# Patient Record
Sex: FEMALE | Race: BLACK OR AFRICAN AMERICAN | Employment: UNEMPLOYED | URBAN - METROPOLITAN AREA
[De-identification: names, ages, dates, MRNs, and addresses within clinical notes are randomized per-mention and may not be internally consistent; named-entity substitution may affect disease eponyms.]

---

## 2017-01-19 RX ORDER — GABAPENTIN 600 MG/1
600 TABLET ORAL 2 TIMES DAILY
Qty: 60 TAB | Refills: 6 | Status: SHIPPED | OUTPATIENT
Start: 2017-01-19 | End: 2017-07-07 | Stop reason: SDUPTHER

## 2017-03-07 RX ORDER — SERTRALINE HYDROCHLORIDE 50 MG/1
50 TABLET, FILM COATED ORAL DAILY
Qty: 30 TAB | Refills: 6 | Status: SHIPPED | OUTPATIENT
Start: 2017-03-07 | End: 2017-11-04 | Stop reason: SDUPTHER

## 2017-06-08 RX ORDER — LOSARTAN POTASSIUM AND HYDROCHLOROTHIAZIDE 25; 100 MG/1; MG/1
1 TABLET ORAL DAILY
Qty: 90 TAB | Refills: 1 | Status: SHIPPED | OUTPATIENT
Start: 2017-06-08 | End: 2017-11-30 | Stop reason: SDUPTHER

## 2017-07-07 ENCOUNTER — OFFICE VISIT (OUTPATIENT)
Dept: INTERNAL MEDICINE CLINIC | Facility: CLINIC | Age: 57
End: 2017-07-07

## 2017-07-07 VITALS
WEIGHT: 173.9 LBS | DIASTOLIC BLOOD PRESSURE: 68 MMHG | SYSTOLIC BLOOD PRESSURE: 98 MMHG | RESPIRATION RATE: 18 BRPM | BODY MASS INDEX: 27.95 KG/M2 | OXYGEN SATURATION: 94 % | TEMPERATURE: 97.9 F | HEIGHT: 66 IN | HEART RATE: 65 BPM

## 2017-07-07 DIAGNOSIS — E66.09 OBESITY DUE TO EXCESS CALORIES, UNSPECIFIED OBESITY SEVERITY: ICD-10-CM

## 2017-07-07 DIAGNOSIS — Z72.0 TOBACCO ABUSE: ICD-10-CM

## 2017-07-07 DIAGNOSIS — E78.5 HYPERLIPIDEMIA, UNSPECIFIED HYPERLIPIDEMIA TYPE: ICD-10-CM

## 2017-07-07 DIAGNOSIS — G89.29 CHRONIC BACK PAIN, UNSPECIFIED BACK LOCATION, UNSPECIFIED BACK PAIN LATERALITY: Primary | ICD-10-CM

## 2017-07-07 DIAGNOSIS — J06.9 VIRAL UPPER RESPIRATORY TRACT INFECTION: ICD-10-CM

## 2017-07-07 DIAGNOSIS — M54.9 CHRONIC BACK PAIN, UNSPECIFIED BACK LOCATION, UNSPECIFIED BACK PAIN LATERALITY: Primary | ICD-10-CM

## 2017-07-07 DIAGNOSIS — I10 ESSENTIAL HYPERTENSION: ICD-10-CM

## 2017-07-07 DIAGNOSIS — F32.A DEPRESSION, UNSPECIFIED DEPRESSION TYPE: ICD-10-CM

## 2017-07-07 DIAGNOSIS — L30.9 ECZEMA, UNSPECIFIED TYPE: ICD-10-CM

## 2017-07-07 RX ORDER — AZITHROMYCIN 250 MG/1
TABLET, FILM COATED ORAL
Qty: 6 TAB | Refills: 0 | Status: SHIPPED | OUTPATIENT
Start: 2017-07-07 | End: 2017-11-16 | Stop reason: ALTCHOICE

## 2017-07-07 RX ORDER — ALBUTEROL SULFATE 90 UG/1
1 AEROSOL, METERED RESPIRATORY (INHALATION)
Qty: 1 INHALER | Refills: 1 | Status: SHIPPED | OUTPATIENT
Start: 2017-07-07 | End: 2020-01-29 | Stop reason: SDUPTHER

## 2017-07-07 RX ORDER — GABAPENTIN 600 MG/1
600 TABLET ORAL 3 TIMES DAILY
Qty: 90 TAB | Refills: 6 | Status: SHIPPED | OUTPATIENT
Start: 2017-07-07 | End: 2018-03-06 | Stop reason: SDUPTHER

## 2017-07-07 RX ORDER — TRIAMCINOLONE ACETONIDE 0.25 MG/G
CREAM TOPICAL DAILY
Qty: 30 G | Refills: 1 | Status: SHIPPED | OUTPATIENT
Start: 2017-07-07 | End: 2017-09-01 | Stop reason: SDUPTHER

## 2017-07-07 NOTE — PROGRESS NOTES
Room 1    Chief Complaint   Patient presents with    Hypertension     Follow up    Chest Congestion     Patient states she has a little chest congestion    Medication Evaluation     Patient would like to discuss increasing dosages on some of her medications     1. Have you been to the ER, urgent care clinic since your last visit? Hospitalized since your last visit? No    2. Have you seen or consulted any other health care providers outside of the [facility] since your last visit? Include any pap smears or colon screening.  No    Health Maintenance Due   Topic Date Due    Hepatitis C Screening  1960    Pneumococcal 19-64 Medium Risk (1 of 1 - PPSV23) 08/22/1977    DTaP/Tdap/Td series (1 - Tdap) 08/22/1979    PAP AKA CERVICAL CYTOLOGY  08/22/1979    BREAST CANCER SCRN MAMMOGRAM  08/22/2008    FOBT Q 1 YEAR AGE 50-75  08/22/2008

## 2017-07-10 NOTE — PROGRESS NOTES
Discussed depression at length with patient. Also discussed alternatives including increasing zoloft to 100 mg or adding tramadol. We also discussed changing the medication altogether. Patient has opted to continue on the zoloft for now. May need to consider Psych consult.

## 2017-09-05 RX ORDER — AMLODIPINE BESYLATE 10 MG/1
TABLET ORAL
Qty: 90 TAB | Refills: 1 | Status: SHIPPED | OUTPATIENT
Start: 2017-09-05 | End: 2017-11-16 | Stop reason: SDUPTHER

## 2017-09-05 RX ORDER — TRIAMCINOLONE ACETONIDE 0.25 MG/G
CREAM TOPICAL
Qty: 30 G | Refills: 1 | Status: SHIPPED | OUTPATIENT
Start: 2017-09-05 | End: 2018-02-05 | Stop reason: SDUPTHER

## 2017-09-05 RX ORDER — AMLODIPINE BESYLATE 10 MG/1
TABLET ORAL
Qty: 90 TAB | Refills: 1 | Status: SHIPPED | OUTPATIENT
Start: 2017-09-05 | End: 2018-02-05 | Stop reason: SDUPTHER

## 2017-11-06 RX ORDER — SERTRALINE HYDROCHLORIDE 50 MG/1
TABLET, FILM COATED ORAL
Qty: 30 TAB | Refills: 6 | Status: SHIPPED | OUTPATIENT
Start: 2017-11-06 | End: 2018-06-05 | Stop reason: SDUPTHER

## 2017-11-16 ENCOUNTER — OFFICE VISIT (OUTPATIENT)
Dept: INTERNAL MEDICINE CLINIC | Facility: CLINIC | Age: 57
End: 2017-11-16

## 2017-11-16 VITALS
HEART RATE: 64 BPM | DIASTOLIC BLOOD PRESSURE: 61 MMHG | RESPIRATION RATE: 18 BRPM | SYSTOLIC BLOOD PRESSURE: 98 MMHG | TEMPERATURE: 97.8 F | BODY MASS INDEX: 27.97 KG/M2 | HEIGHT: 66 IN | WEIGHT: 174 LBS

## 2017-11-16 DIAGNOSIS — G89.29 CHRONIC BACK PAIN, UNSPECIFIED BACK LOCATION, UNSPECIFIED BACK PAIN LATERALITY: ICD-10-CM

## 2017-11-16 DIAGNOSIS — M54.9 CHRONIC BACK PAIN, UNSPECIFIED BACK LOCATION, UNSPECIFIED BACK PAIN LATERALITY: ICD-10-CM

## 2017-11-16 DIAGNOSIS — M48.062 SPINAL STENOSIS OF LUMBAR REGION WITH NEUROGENIC CLAUDICATION: Primary | ICD-10-CM

## 2017-11-16 DIAGNOSIS — M25.50 ARTHRALGIA, UNSPECIFIED JOINT: ICD-10-CM

## 2017-11-16 RX ORDER — METHYLPREDNISOLONE 4 MG/1
TABLET ORAL
Qty: 1 DOSE PACK | Refills: 0 | Status: SHIPPED | OUTPATIENT
Start: 2017-11-16 | End: 2017-11-22

## 2017-11-16 NOTE — PROGRESS NOTES
Subjective:      Beata Pendleton is a 59 y.o. female who presents today for No chief complaint on file. Patient in today because of concerns regarding pain. She says her arthritis pain is getting worse. She sees Dr. Brendan Lewis. She takes oxycodone 5 times daily. She feels like she is becoming tolerant. She has discussed this with pain management  Over the last year the pain has becoming worse although this regimen worked at first. She takes flexeril and gabapentin daily as well  She says he pain is 7/10 on a regular basis    She asks today about other alternatives like Embrel or time release pain medication. Patient Active Problem List    Diagnosis Date Noted    Spinal stenosis of lumbar region with neurogenic claudication 06/18/2015    Disc herniation 06/18/2015    Chronic back pain 09/17/2012    Joint pain 09/17/2012    Joint stiffness 09/17/2012    HTN (hypertension) 09/17/2012    Arthritis 09/17/2012     Current Outpatient Prescriptions   Medication Sig Dispense Refill    sertraline (ZOLOFT) 50 mg tablet take 1 tablet by mouth once daily 30 Tab 6    amLODIPine (NORVASC) 10 mg tablet take 1 tablet by mouth once daily 90 Tab 1    triamcinolone acetonide (KENALOG) 0.025 % topical cream apply to affected area once daily 30 g 1    gabapentin (NEURONTIN) 600 mg tablet Take 1 Tab by mouth three (3) times daily. 90 Tab 6    albuterol (PROVENTIL HFA, VENTOLIN HFA, PROAIR HFA) 90 mcg/actuation inhaler Take 1 Puff by inhalation every four (4) hours as needed for Wheezing. 1 Inhaler 1    losartan-hydroCHLOROthiazide (HYZAAR) 100-25 mg per tablet Take 1 Tab by mouth daily. 90 Tab 1    cyclobenzaprine (FLEXERIL) 10 mg tablet Take 1 Tab by mouth three (3) times daily as needed for Muscle Spasm(s). 30 Tab 1    oxyCODONE IR (OXY-IR) 15 mg immediate release tablet       guaiFENesin-codeine (GUAIFENESIN AC) 100-10 mg/5 mL solution Take 5 mL by mouth three (3) times daily as needed for Cough.  Max Daily Amount: 15 mL. 236 mL 0     Allergies   Allergen Reactions    Other Medication Other (comments)     Pt stated it caused her stomach to hurt when she took the PO dilaudid but has been given it IV with no reaction. Past Medical History:   Diagnosis Date    Chronic pain     low back pain    Constipation     Depression     Fibromyalgia     Hypertension     Osteoarthritis     Other ill-defined conditions     chronic kidney infections    Second hand smoke exposure     Sickle cell trait ([address]     pt reports    Spinal stenosis     L5-S1 left moderate to severe     Past Surgical History:   Procedure Laterality Date    HX  SECTION      HX ORTHOPAEDIC      R wrist surgery and foot surgery    HX TUBAL LIGATION       Family History   Problem Relation Age of Onset    Hypertension Mother     Sickle Cell Trait Mother     Diabetes Mother     Stroke Mother      SEVERAL TIAS    Hypertension Father     Heart Attack Father     Heart Disease Father     Hypertension Other      sibling    Anesth Problems Neg Hx      Social History   Substance Use Topics    Smoking status: Current Every Day Smoker     Packs/day: 0.30     Years: 30.00     Types: Cigarettes    Smokeless tobacco: Never Used    Alcohol use No        Review of Systems    A comprehensive review of systems was negative except for that written in the HPI. Objective:     Visit Vitals    BP 98/61    Pulse 64    Temp 97.8 °F (36.6 °C) (Oral)    Resp 18    Ht 5' 6\" (1.676 m)    Wt 174 lb (78.9 kg)    BMI 28.08 kg/m2     General:  Alert, cooperative, no distress, appears stated age. Head:  Normocephalic, without obvious abnormality, atraumatic. Eyes:  Conjunctivae/corneas clear. PERRL, EOMs intact. Fundi benign. Ears:  Normal TMs and external ear canals both ears. Nose: Nares normal. Septum midline. Mucosa normal. No drainage or sinus tenderness.    Throat: Lips, mucosa, and tongue normal. Teeth and gums normal.   Neck: Supple, symmetrical, trachea midline, no adenopathy, thyroid: no enlargement/tenderness/nodules, no carotid bruit and no JVD. Back:   Symmetric, no curvature. ROM normal. No CVA tenderness. Lungs:   Clear to auscultation bilaterally. Chest wall:  No tenderness or deformity. Heart:  Regular rate and rhythm, S1, S2 normal, no murmur, click, rub or gallop. Assessment/Plan:       ICD-10-CM ICD-9-CM    1. Spinal stenosis of lumbar region with neurogenic claudication M48.062 724.03 Medrol pack    Follow up with pain management        Had extensive discussion with patient and let her know that she would not be a candidate for Embrel due to the nature of her arthritis. I also stated that I would not be able to change her pain management. She understands that she has a contract with Dr. Merlyn Dewitt. Will try medrol pack although patient is aware this may provide only temporary relief. I can also refer her to rheumatology if she is interested in that   2. Arthralgia, unspecified joint M25.50 719.40 Will refer to rheumatology if patient interested in further eval   3. Chronic back pain, unspecified back location, unspecified back pain laterality M54.9 724.5     G89.29 338.29        Follow-up Disposition: Not on File   Advised her to call back or return to office if symptoms worsen/change/persist.  Discussed expected course/resolution/complications of diagnosis in detail with patient. Medication risks/benefits/costs/interactions/alternatives discussed with patient. She was given an after visit summary which includes diagnoses, current medications, & vitals. She expressed understanding with the diagnosis and plan.

## 2017-11-16 NOTE — MR AVS SNAPSHOT
Visit Information Date & Time Provider Department Dept. Phone Encounter #  
 11/16/2017 11:00 AM Danny Lawson, 85 [facility] Internal Medicine [phone] [id number] Follow-up Instructions Return if symptoms worsen or fail to improve, for follow up. Upcoming Health Maintenance Date Due Hepatitis C Screening 1960 Pneumococcal 19-64 Medium Risk (1 of 1 - PPSV23) 8/22/1977 DTaP/Tdap/Td series (1 - Tdap) 8/22/1979 PAP AKA CERVICAL CYTOLOGY 8/22/1979 BREAST CANCER SCRN MAMMOGRAM 8/22/2008 FOBT Q 1 YEAR AGE 50-75 8/22/2008 Influenza Age 5 to Adult 8/1/2017 Allergies as of 11/16/2017  Review Complete On: 7/7/2017 By: Josephine Butler LPN Severity Noted Reaction Type Reactions Other Medication  06/18/2015    Other (comments) Pt stated it caused her stomach to hurt when she took the PO dilaudid but has been given it IV with no reaction. Current Immunizations  Reviewed on 12/18/2015 Name Date Influenza Vaccine (Trivalent) 12/18/2015 Not reviewed this visit You Were Diagnosed With   
  
 Codes Comments Spinal stenosis of lumbar region with neurogenic claudication    -  Primary ICD-10-CM: Z54.771 
ICD-9-CM: 724.03 Arthralgia, unspecified joint     ICD-10-CM: M25.50 ICD-9-CM: 719.40 Chronic back pain, unspecified back location, unspecified back pain laterality     ICD-10-CM: M54.9, G89.29 ICD-9-CM: 724.5, 338.29 Vitals BP Pulse Temp Resp Height(growth percentile) Weight(growth percentile) 98/61 64 97.8 °F (36.6 °C) (Oral) 18 5' 6\" (1.676 m) 174 lb (78.9 kg) BMI OB Status Smoking Status 28.08 kg/m2 Postmenopausal Current Every Day Smoker Vitals History BMI and BSA Data Body Mass Index Body Surface Area 28.08 kg/m 2 1.92 m 2 Preferred Pharmacy Pharmacy Name Phone [facility] [address] [place] [address] [phone] Your Updated Medication List  
  
   
This list is accurate as of: 11/16/17 12:08 PM.  Always use your most recent med list.  
  
  
  
  
 albuterol 90 mcg/actuation inhaler Commonly known as:  PROVENTIL HFA, VENTOLIN HFA, PROAIR HFA Take 1 Puff by inhalation every four (4) hours as needed for Wheezing. amLODIPine 10 mg tablet Commonly known as:  NORVASC  
take 1 tablet by mouth once daily  
  
 cyclobenzaprine 10 mg tablet Commonly known as:  FLEXERIL Take 1 Tab by mouth three (3) times daily as needed for Muscle Spasm(s). gabapentin 600 mg tablet Commonly known as:  NEURONTIN Take 1 Tab by mouth three (3) times daily. guaiFENesin-codeine 100-10 mg/5 mL solution Commonly known as:  guaiFENesin AC Take 5 mL by mouth three (3) times daily as needed for Cough. Max Daily Amount: 15 mL. losartan-hydroCHLOROthiazide 100-25 mg per tablet Commonly known as:  HYZAAR Take 1 Tab by mouth daily. methylPREDNISolone 4 mg tablet Commonly known as:  MEDROL DOSEPACK  
use as directed  
  
 oxyCODONE IR 15 mg immediate release tablet Commonly known as:  OXY-IR  
  
 sertraline 50 mg tablet Commonly known as:  ZOLOFT  
take 1 tablet by mouth once daily  
  
 triamcinolone acetonide 0.025 % topical cream  
Commonly known as:  KENALOG  
apply to affected area once daily Prescriptions Sent to Pharmacy Refills  
 methylPREDNISolone (MEDROL DOSEPACK) 4 mg tablet 0 Sig: use as directed Class: Normal  
 Pharmacy: [address] [place] [address] #: [phone] Follow-up Instructions Return if symptoms worsen or fail to improve, for follow up. Introducing [facility] & [facility]! Dear Galen Vann: Thank you for requesting a [employer] account. Our records indicate that you already have an active [employer] account. You can access your account anytime at [email or web address]. [employer] Did you know that you can access your hospital and ER discharge instructions at any time in tok tok tok? You can also review all of your test results from your hospital stay or ER visit. Additional Information If you have questions, please visit the Frequently Asked Questions section of the tok tok tok website at [email or web address] Remember, tok tok tok is NOT to be used for urgent needs. For medical emergencies, dial 911. Now available from your iPhone and Android! Please provide this summary of care documentation to your next provider. Your primary care clinician is listed as Severa Fergusson. If you have any questions after today's visit, please call [phone].

## 2017-11-16 NOTE — PROGRESS NOTES
No chief complaint on file. 1. Have you been to the ER, urgent care clinic since your last visit? Hospitalized since your last visit? No    2. Have you seen or consulted any other health care providers outside of the [address] [place] [address] since your last visit? Include any pap smears or colon screening.  No

## 2017-12-02 RX ORDER — LOSARTAN POTASSIUM AND HYDROCHLOROTHIAZIDE 25; 100 MG/1; MG/1
TABLET ORAL
Qty: 90 TAB | Refills: 1 | Status: SHIPPED | OUTPATIENT
Start: 2017-12-02 | End: 2018-03-06 | Stop reason: SDUPTHER

## 2018-02-05 ENCOUNTER — OFFICE VISIT (OUTPATIENT)
Dept: INTERNAL MEDICINE CLINIC | Facility: CLINIC | Age: 58
End: 2018-02-05

## 2018-02-05 VITALS
TEMPERATURE: 97.3 F | RESPIRATION RATE: 18 BRPM | SYSTOLIC BLOOD PRESSURE: 125 MMHG | WEIGHT: 174 LBS | HEART RATE: 72 BPM | DIASTOLIC BLOOD PRESSURE: 75 MMHG | HEIGHT: 66 IN | BODY MASS INDEX: 27.97 KG/M2

## 2018-02-05 DIAGNOSIS — F32.A DEPRESSION, UNSPECIFIED DEPRESSION TYPE: ICD-10-CM

## 2018-02-05 DIAGNOSIS — M54.9 CHRONIC BACK PAIN, UNSPECIFIED BACK LOCATION, UNSPECIFIED BACK PAIN LATERALITY: Primary | ICD-10-CM

## 2018-02-05 DIAGNOSIS — Z13.1 DIABETES MELLITUS SCREENING: ICD-10-CM

## 2018-02-05 DIAGNOSIS — L30.9 ECZEMA, UNSPECIFIED TYPE: ICD-10-CM

## 2018-02-05 DIAGNOSIS — G89.29 CHRONIC BACK PAIN, UNSPECIFIED BACK LOCATION, UNSPECIFIED BACK PAIN LATERALITY: Primary | ICD-10-CM

## 2018-02-05 DIAGNOSIS — I10 ESSENTIAL HYPERTENSION: ICD-10-CM

## 2018-02-05 LAB — HBA1C MFR BLD HPLC: 5.4 %

## 2018-02-05 RX ORDER — TRIAMCINOLONE ACETONIDE 0.25 MG/G
CREAM TOPICAL
Qty: 30 G | Refills: 2 | Status: SHIPPED | OUTPATIENT
Start: 2018-02-05 | End: 2018-06-11 | Stop reason: SDUPTHER

## 2018-02-05 RX ORDER — AMLODIPINE BESYLATE 10 MG/1
TABLET ORAL
Qty: 90 TAB | Refills: 1 | Status: SHIPPED | OUTPATIENT
Start: 2018-02-05 | End: 2018-12-04 | Stop reason: SDUPTHER

## 2018-02-05 NOTE — MR AVS SNAPSHOT
[address] [place] [address] 
[phone] Patient: Neri Rivas MRN: [id number] [id number] Visit Information Date & Time Provider Department Dept. Phone Encounter #  
 2/5/2018 10:00 AM Ginger Barr, 85 [facility] Internal Medicine [phone] [id number] Follow-up Instructions Return in about 4 weeks (around 3/6/2018) for follow up medicare wellness. Upcoming Health Maintenance Date Due Hepatitis C Screening 1960 Pneumococcal 19-64 Medium Risk (1 of 1 - PPSV23) 8/22/1977 DTaP/Tdap/Td series (1 - Tdap) 8/22/1979 PAP AKA CERVICAL CYTOLOGY 8/22/1979 BREAST CANCER SCRN MAMMOGRAM 8/22/2008 FOBT Q 1 YEAR AGE 50-75 8/22/2008 Influenza Age 5 to Adult 8/1/2017 Allergies as of 2/5/2018  Review Complete On: 2/5/2018 By: Isha Fernández LPN Severity Noted Reaction Type Reactions Other Medication  06/18/2015    Other (comments) Pt stated it caused her stomach to hurt when she took the PO dilaudid but has been given it IV with no reaction. Current Immunizations  Reviewed on 12/18/2015 Name Date Influenza Vaccine (Trivalent) 12/18/2015 Not reviewed this visit You Were Diagnosed With   
  
 Codes Comments Chronic back pain, unspecified back location, unspecified back pain laterality    -  Primary ICD-10-CM: M54.9, G89.29 ICD-9-CM: 724.5, 338.29 Essential hypertension     ICD-10-CM: I10 
ICD-9-CM: 401.9 Eczema, unspecified type     ICD-10-CM: L30.9 ICD-9-CM: 692.9 Depression, unspecified depression type     ICD-10-CM: F32.9 ICD-9-CM: 533 Vitals BP Pulse Temp Resp Height(growth percentile) Weight(growth percentile) 125/75 72 97.3 °F (36.3 °C) (Oral) 18 5' 6\" (1.676 m) 174 lb (78.9 kg) BMI OB Status Smoking Status 28.08 kg/m2 Postmenopausal Current Every Day Smoker Vitals History BMI and BSA Data Body Mass Index Body Surface Area 28.08 kg/m 2 1.92 m 2 Preferred Pharmacy Pharmacy Name Phone [facility] [address] [place] [address]. [phone] Your Updated Medication List  
  
   
This list is accurate as of: 2/5/18 10:57 AM.  Always use your most recent med list.  
  
  
  
  
 albuterol 90 mcg/actuation inhaler Commonly known as:  PROVENTIL HFA, VENTOLIN HFA, PROAIR HFA Take 1 Puff by inhalation every four (4) hours as needed for Wheezing. amLODIPine 10 mg tablet Commonly known as:  NORVASC  
take 1 tablet by mouth once daily  
  
 cyclobenzaprine 10 mg tablet Commonly known as:  FLEXERIL Take 1 Tab by mouth three (3) times daily as needed for Muscle Spasm(s). gabapentin 600 mg tablet Commonly known as:  NEURONTIN Take 1 Tab by mouth three (3) times daily. losartan-hydroCHLOROthiazide 100-25 mg per tablet Commonly known as:  HYZAAR  
take 1 tablet by mouth once daily  
  
 oxyCODONE IR 15 mg immediate release tablet Commonly known as:  OXY-IR  
  
 sertraline 50 mg tablet Commonly known as:  ZOLOFT  
take 1 tablet by mouth once daily  
  
 triamcinolone acetonide 0.025 % topical cream  
Commonly known as:  KENALOG  
apply to affected area once daily Prescriptions Sent to Pharmacy Refills  
 triamcinolone acetonide (KENALOG) 0.025 % topical cream 2 Sig: apply to affected area once daily Class: Normal  
 Pharmacy: [address] Ph #: [phone]  
 amLODIPine (NORVASC) 10 mg tablet 1 Sig: take 1 tablet by mouth once daily Class: Normal  
 Pharmacy: [address] [place] [address]. Ph #: [phone] We Performed the Following METABOLIC PANEL, COMPREHENSIVE [32143 CPT(R)] Follow-up Instructions Return in about 4 weeks (around 3/6/2018) for follow up medicare wellness. Introducing [facility] & HEALTH SERVICES! Dear Tova Osman: Thank you for requesting a [employer] account. Our records indicate that you already have an active [employer] account. You can access your account anytime at [email or web address]. Clifford [employer] Did you know that you can access your hospital and ER discharge instructions at any time in [employer]? You can also review all of your test results from your hospital stay or ER visit. Additional Information If you have questions, please visit the Frequently Asked Questions section of the [employer] website at [email or web address] Remember, [employer] is NOT to be used for urgent needs. For medical emergencies, dial 911. Now available from your iPhone and Android! Please provide this summary of care documentation to your next provider. Your primary care clinician is listed as Lisa Sutherland. If you have any questions after today's visit, please call [phone].

## 2018-02-05 NOTE — PROGRESS NOTES
Subjective:      Lorrie Ridley is a 59 y.o. female who presents today for No chief complaint on file. Hypertension- compliant with meds, denies side effects      Chronic back pain-sees pain management.      Depression-  Taking Zoloft 50 mg 1 tab po daily, tolerating well.       Tobacco abuse -patient still smoking She would like to quit. However states smoking cigarettes helps her deal with stress.     Obesity has been working on weight loss. Weight is unchanged from November. She tried to pay closer attention to diet over the holidays     Eczema- uses steroid cream regularly- works well for dryness and scaling of her hands    Flu vaccine utd       Patient Active Problem List    Diagnosis Date Noted    Spinal stenosis of lumbar region with neurogenic claudication 06/18/2015    Disc herniation 06/18/2015    Chronic back pain 09/17/2012    Joint pain 09/17/2012    Joint stiffness 09/17/2012    HTN (hypertension) 09/17/2012    Arthritis 09/17/2012     Current Outpatient Prescriptions   Medication Sig Dispense Refill    losartan-hydroCHLOROthiazide (HYZAAR) 100-25 mg per tablet take 1 tablet by mouth once daily 90 Tab 1    sertraline (ZOLOFT) 50 mg tablet take 1 tablet by mouth once daily 30 Tab 6    amLODIPine (NORVASC) 10 mg tablet take 1 tablet by mouth once daily 90 Tab 1    triamcinolone acetonide (KENALOG) 0.025 % topical cream apply to affected area once daily 30 g 1    gabapentin (NEURONTIN) 600 mg tablet Take 1 Tab by mouth three (3) times daily. 90 Tab 6    albuterol (PROVENTIL HFA, VENTOLIN HFA, PROAIR HFA) 90 mcg/actuation inhaler Take 1 Puff by inhalation every four (4) hours as needed for Wheezing. 1 Inhaler 1    cyclobenzaprine (FLEXERIL) 10 mg tablet Take 1 Tab by mouth three (3) times daily as needed for Muscle Spasm(s).  30 Tab 1    oxyCODONE IR (OXY-IR) 15 mg immediate release tablet        Allergies   Allergen Reactions    Other Medication Other (comments)     Pt stated it caused her stomach to hurt when she took the PO dilaudid but has been given it IV with no reaction. Past Medical History:   Diagnosis Date    Chronic pain     low back pain    Constipation     Depression     Fibromyalgia     Hypertension     Osteoarthritis     Other ill-defined conditions     chronic kidney infections    Second hand smoke exposure     Sickle cell trait ([address]     pt reports    Spinal stenosis     L5-S1 left moderate to severe     Past Surgical History:   Procedure Laterality Date    HX  SECTION      HX ORTHOPAEDIC      R wrist surgery and foot surgery    HX TUBAL LIGATION       Family History   Problem Relation Age of Onset    Hypertension Mother     Sickle Cell Trait Mother     Diabetes Mother     Stroke Mother      SEVERAL TIAS    Hypertension Father     Heart Attack Father     Heart Disease Father     Hypertension Other      sibling    Anesth Problems Neg Hx      Social History   Substance Use Topics    Smoking status: Current Every Day Smoker     Packs/day: 0.30     Years: 30.00     Types: Cigarettes    Smokeless tobacco: Never Used    Alcohol use No        Review of Systems    A comprehensive review of systems was negative except for that written in the HPI. Objective:     Visit Vitals    /75    Pulse 72    Temp 97.3 °F (36.3 °C) (Oral)    Resp 18    Ht 5' 6\" (1.676 m)    Wt 174 lb (78.9 kg)    BMI 28.08 kg/m2     General:  Alert, cooperative, no distress, appears stated age. Head:  Normocephalic, without obvious abnormality, atraumatic. Eyes:  Conjunctivae/corneas clear. PERRL, EOMs intact. Fundi benign. Ears:  Normal TMs and external ear canals both ears. Nose: Nares normal. Septum midline. Mucosa normal. No drainage or sinus tenderness.    Throat: Lips, mucosa, and tongue normal. Teeth and gums normal.   Neck: Supple, symmetrical, trachea midline, no adenopathy, thyroid: no enlargement/tenderness/nodules, no carotid bruit and no JVD.   Back:   Symmetric, no curvature. No CVA tenderness. Lungs:   Clear to auscultation bilaterally. Chest wall:  No tenderness or deformity. Heart:  Regular rate and rhythm, S1, S2 normal, no murmur, click, rub or gallop. Abdomen:   Soft, non-tender. Bowel sounds normal. No masses,  No organomegaly. Extremities: Extremities normal, atraumatic, no cyanosis or edema. Pulses: 2+ and symmetric all extremities. Skin: Scale and peeling noted on and in between fingers bilaterally               Assessment/Plan:       ICD-10-CM ICD-9-CM    1. Chronic back pain, unspecified back location, unspecified back pain laterality M54.9 724.5 Sees pain management    G89.29 338.29    2. Essential hypertension E14 381.8 METABOLIC PANEL, COMPREHENSIVE  Losartan/hctz  amlodipine   3. Eczema, unspecified type L30.9 692.9 Triamcinolone cream   4. Depression, unspecified depression type F32.9 311 zoloft daily   5. Diabetes mellitus screening Z13.1 V77.1 AMB POC HEMOGLOBIN A1C       Follow-up Disposition: Not on File   Advised her to call back or return to office if symptoms worsen/change/persist.  Discussed expected course/resolution/complications of diagnosis in detail with patient. Medication risks/benefits/costs/interactions/alternatives discussed with patient. She was given an after visit summary which includes diagnoses, current medications, & vitals. She expressed understanding with the diagnosis and plan.

## 2018-02-05 NOTE — LETTER
NOTIFICATION OF RETURN TO WORK / SCHOOL 
 
2/5/2018 Ms. [facility] Negrete [phone] To Whom It May Concern: 
 
Eugenia Negrete is under the care of [facility]. She has multiple chronic medical issues including chronic pain secondary to severe 
 
 arthritis and chronic back pain. She has undergone back surgery in the past and has 
 
 seen multiple specialists. She is currently under the care of a pain management 
 
 specialist. In my opinion, Ms. Essie Salazar is not a candidate to work due to her chronic pain 
 
 as well as the sedating effects of the medications she has been prescribed to take on a 
 
 daily basis. If there are questions or concerns please have the patient contact our office. Sincerely, Radha Kaufman MD

## 2018-02-05 NOTE — PROGRESS NOTES
No chief complaint on file. 1. Have you been to the ER, urgent care clinic since your last visit? Hospitalized since your last visit? No    2. Have you seen or consulted any other health care providers outside of the [address] [place] [address] since your last visit? Include any pap smears or colon screening.  No

## 2018-02-06 LAB
ALBUMIN SERPL-MCNC: 4.6 G/DL (ref 3.5–5.5)
ALBUMIN/GLOB SERPL: 1.5 {RATIO} (ref 1.2–2.2)
ALP SERPL-CCNC: 103 IU/L (ref 39–117)
ALT SERPL-CCNC: 5 IU/L (ref 0–32)
AST SERPL-CCNC: 10 IU/L (ref 0–40)
BILIRUB SERPL-MCNC: 0.3 MG/DL (ref 0–1.2)
BUN SERPL-MCNC: 6 MG/DL (ref 6–24)
BUN/CREAT SERPL: 7 (ref 9–23)
CALCIUM SERPL-MCNC: 9.8 MG/DL (ref 8.7–10.2)
CHLORIDE SERPL-SCNC: 100 MMOL/L (ref 96–106)
CO2 SERPL-SCNC: 27 MMOL/L (ref 18–29)
CREAT SERPL-MCNC: 0.82 MG/DL (ref 0.57–1)
GFR SERPLBLD CREATININE-BSD FMLA CKD-EPI: 79 ML/MIN/1.73
GFR SERPLBLD CREATININE-BSD FMLA CKD-EPI: 91 ML/MIN/1.73
GLOBULIN SER CALC-MCNC: 3.1 G/DL (ref 1.5–4.5)
GLUCOSE SERPL-MCNC: 94 MG/DL (ref 65–99)
POTASSIUM SERPL-SCNC: 3 MMOL/L (ref 3.5–5.2)
PROT SERPL-MCNC: 7.7 G/DL (ref 6–8.5)
SODIUM SERPL-SCNC: 143 MMOL/L (ref 134–144)

## 2018-02-09 ENCOUNTER — TELEPHONE (OUTPATIENT)
Dept: INTERNAL MEDICINE CLINIC | Facility: CLINIC | Age: 58
End: 2018-02-09

## 2018-02-09 RX ORDER — POTASSIUM CHLORIDE 750 MG/1
10 TABLET, EXTENDED RELEASE ORAL DAILY
Qty: 10 TAB | Refills: 0 | Status: SHIPPED | OUTPATIENT
Start: 2018-02-09 | End: 2018-02-11

## 2018-02-09 NOTE — TELEPHONE ENCOUNTER
V. O.R.B given by Dr. Lady Hernandez to send potassium 10MEQ one tab daily for 10 days no refills.  Valentina Redman, AGNESN

## 2018-02-11 ENCOUNTER — HOSPITAL ENCOUNTER (EMERGENCY)
Age: 58
Discharge: HOME | End: 2018-02-11
Attending: INTERNAL MEDICINE
Payer: MEDICARE

## 2018-02-11 VITALS
TEMPERATURE: 98.1 F | WEIGHT: 173 LBS | HEIGHT: 67 IN | DIASTOLIC BLOOD PRESSURE: 102 MMHG | BODY MASS INDEX: 27.15 KG/M2 | HEART RATE: 79 BPM | SYSTOLIC BLOOD PRESSURE: 149 MMHG | RESPIRATION RATE: 18 BRPM | OXYGEN SATURATION: 99 %

## 2018-02-11 DIAGNOSIS — M54.9 CHRONIC BACK PAIN, UNSPECIFIED BACK LOCATION, UNSPECIFIED BACK PAIN LATERALITY: ICD-10-CM

## 2018-02-11 DIAGNOSIS — M48.062 SPINAL STENOSIS OF LUMBAR REGION WITH NEUROGENIC CLAUDICATION: Primary | ICD-10-CM

## 2018-02-11 DIAGNOSIS — G89.29 CHRONIC BACK PAIN, UNSPECIFIED BACK LOCATION, UNSPECIFIED BACK PAIN LATERALITY: ICD-10-CM

## 2018-02-11 PROCEDURE — 74011250637 HC RX REV CODE- 250/637: Performed by: INTERNAL MEDICINE

## 2018-02-11 PROCEDURE — 99283 EMERGENCY DEPT VISIT LOW MDM: CPT

## 2018-02-11 RX ORDER — OXYCODONE AND ACETAMINOPHEN 5; 325 MG/1; MG/1
1 TABLET ORAL
Status: COMPLETED | OUTPATIENT
Start: 2018-02-11 | End: 2018-02-11

## 2018-02-11 RX ADMIN — OXYCODONE HYDROCHLORIDE AND ACETAMINOPHEN 1 TABLET: 5; 325 TABLET ORAL at 11:21

## 2018-02-11 NOTE — ED TRIAGE NOTES
Pt with hx of sciatica and arthritis presents with c/o worsening low back and SELMA lower leg pain. States she takes oxycodone, GBP and tylenol as needed but unable to get oxycodone filled. No new injury or event precipitating onset of this episode.

## 2018-02-11 NOTE — ED NOTES
Patient here with c/o lower back pain. Patient states that she has hx of sciatica and arthritis, states that she has had back surgery in the past but states that she has had continued chronic pain. Patient states that she was running low on her home pain medication and states that she had a prescription for more however states that when she went to the pharmacy with her prescription that they wouldn't fill it telling her it was too soon. Patient states \"I need to get a shot or something because I can't sleep! \"  Patient also states \"I guess I need to go back to my doctor, my pain specialist tomorrow and see what they can do. \"  Patient's  reports patient had a fall \"a few weeks ago, she hurt her arm\". Emergency Department Nursing Plan of Care       The Nursing Plan of Care is developed from the Nursing assessment and Emergency Department Attending provider initial evaluation. The plan of care may be reviewed in the ED Provider note.     The Plan of Care was developed with the following considerations:   Patient / Family readiness to learn indicated by:verbalized understanding  Persons(s) to be included in education: patient and family  Barriers to Learning/Limitations:No    Signed     Zandra Boyce RN    2/11/2018   10:43 AM

## 2018-02-11 NOTE — LETTER
[facility] EMERGENCY DEPT 
[address] [id number] [phone] Work/School Note Date: 2/11/2018 To Whom It May concern: 
 
Aurelia Cockayne was seen and treated today in the emergency room by the following provider(s): 
Attending Provider: Alin Bentley MD.   
 
Aurelia Cockayne may return to work on 2/14/2018. Sincerely, Kathy DOMÍNGUEZ

## 2018-02-11 NOTE — ED PROVIDER NOTES
EMERGENCY DEPARTMENT HISTORY AND PHYSICAL EXAM      Date: 2/11/2018  Patient Name: Marybeth Krause    History of Presenting Illness     Chief Complaint   Patient presents with    Back Pain     states hx back sugery \"still never was right\"       History Provided By: Patient    HPI: Marybeth Krause, 59 y.o. female with PMHx significant for HTN, chronic back pain, and osteoarthrits, presents ambulatory to the ED with cc of constant moderate aching chronic back pain. Patient ran out of her prescribed Oxycodone and was unable to refill her prescription yesterday. Patient denies any relief at home from Gabapentin or Tylenol. Patient denies any associated symptoms or complaints. PCP: Martha Villaseñor MD    There are no other complaints, changes, or physical findings at this time. Current Outpatient Prescriptions   Medication Sig Dispense Refill    triamcinolone acetonide (KENALOG) 0.025 % topical cream apply to affected area once daily 30 g 2    amLODIPine (NORVASC) 10 mg tablet take 1 tablet by mouth once daily 90 Tab 1    losartan-hydroCHLOROthiazide (HYZAAR) 100-25 mg per tablet take 1 tablet by mouth once daily 90 Tab 1    sertraline (ZOLOFT) 50 mg tablet take 1 tablet by mouth once daily 30 Tab 6    gabapentin (NEURONTIN) 600 mg tablet Take 1 Tab by mouth three (3) times daily. 90 Tab 6    cyclobenzaprine (FLEXERIL) 10 mg tablet Take 1 Tab by mouth three (3) times daily as needed for Muscle Spasm(s). 30 Tab 1    oxyCODONE IR (OXY-IR) 15 mg immediate release tablet       albuterol (PROVENTIL HFA, VENTOLIN HFA, PROAIR HFA) 90 mcg/actuation inhaler Take 1 Puff by inhalation every four (4) hours as needed for Wheezing.  1 Inhaler 1       Past History     Past Medical History:  Past Medical History:   Diagnosis Date    Chronic pain     low back pain    Constipation     Depression     Fibromyalgia     Hypertension     Osteoarthritis     Other ill-defined conditions(010.24)     chronic kidney infections    Second hand smoke exposure     Sickle cell trait ([facility] [address]     pt reports    Spinal stenosis     L5-S1 left moderate to severe       Past Surgical History:  Past Surgical History:   Procedure Laterality Date    HX  SECTION      HX ORTHOPAEDIC      R wrist surgery and foot surgery    HX TUBAL LIGATION         Family History:  Family History   Problem Relation Age of Onset    Hypertension Mother     Sickle Cell Trait Mother     Diabetes Mother     Stroke Mother      SEVERAL TIAS    Hypertension Father     Heart Attack Father     Heart Disease Father     Hypertension Other      sibling    Anesth Problems Neg Hx        Social History:  Social History   Substance Use Topics    Smoking status: Current Every Day Smoker     Packs/day: 0.30     Years: 30.00     Types: Cigarettes    Smokeless tobacco: Never Used    Alcohol use No       Allergies: Allergies   Allergen Reactions    Other Medication Other (comments)     Pt stated it caused her stomach to hurt when she took the PO dilaudid but has been given it IV with no reaction. Review of Systems   Review of Systems   Constitutional: Negative. Negative for chills, diaphoresis and fever. HENT: Negative. Negative for congestion, sore throat and trouble swallowing. Eyes: Negative. Negative for photophobia, pain and redness. Respiratory: Negative. Negative for cough, chest tightness, shortness of breath and wheezing. Cardiovascular: Negative. Negative for chest pain and palpitations. Gastrointestinal: Negative. Negative for abdominal pain, blood in stool, diarrhea, nausea and vomiting. Genitourinary: Negative for difficulty urinating, dysuria and frequency. Musculoskeletal: Positive for back pain (chronic). Negative for arthralgias, myalgias, neck pain and neck stiffness. Skin: Negative. Neurological: Negative. Negative for dizziness, tremors, seizures, syncope, speech difficulty, light-headedness and headaches. Psychiatric/Behavioral: Negative. Negative for confusion. The patient is not nervous/anxious. All other systems reviewed and are negative. Physical Exam   Physical Exam   Constitutional: She is oriented to person, place, and time. She appears well-developed and well-nourished. Tearful   HENT:   Head: Normocephalic and atraumatic. Mouth/Throat: Oropharynx is clear and moist.   Eyes: Conjunctivae and EOM are normal. Pupils are equal, round, and reactive to light. Neck: Normal range of motion. Neck supple. Cardiovascular: Normal rate, regular rhythm and normal heart sounds. Exam reveals no gallop and no friction rub. No murmur heard. Pulmonary/Chest: Effort normal and breath sounds normal. No respiratory distress. She has no wheezes. She has no rales. Abdominal: Soft. Bowel sounds are normal. She exhibits no distension. There is no tenderness. There is no rebound and no guarding. Musculoskeletal: Normal range of motion. She exhibits no edema or tenderness. Lymphadenopathy:     She has no cervical adenopathy. Neurological: She is alert and oriented to person, place, and time. She has normal strength. No cranial nerve deficit or sensory deficit. She displays a negative Romberg sign. Coordination and gait normal.   Skin: Skin is warm and dry. No ecchymosis, no lesion and no rash noted. Rash is not urticarial. She is not diaphoretic. No erythema. Psychiatric: She has a normal mood and affect. Nursing note and vitals reviewed. Medical Decision Making   I am the first provider for this patient. I reviewed the vital signs, available nursing notes, past medical history, past surgical history, family history and social history. Vital Signs-Reviewed the patient's vital signs.   Patient Vitals for the past 12 hrs:   Temp Pulse Resp BP SpO2   02/11/18 1030 98.1 °F (36.7 °C) 79 18 (!) 149/102 99 %     Records Reviewed: Nursing Notes and Old Medical Records    Provider Notes (Medical Decision Making):   DDx: Chronic pain, Narcotic dependence, Polypharmacy, Possible drug diversion. ED Course:   Initial assessment performed. The patients presenting problems have been discussed, and they are in agreement with the care plan formulated and outlined with them. I have encouraged them to ask questions as they arise throughout their visit. Disposition:  Discharge Note:  11:24 AM  The pt is ready for discharge. The pt's signs, symptoms, diagnosis, and discharge instructions have been discussed and pt has conveyed their understanding. The pt is to follow up as recommended or return to ER should their symptoms worsen. Plan has been discussed and pt is in agreement. PLAN:  1. Discharge    2. Follow-up Information     Follow up With Details Comments Contact Info    Neil Jensen MD In 2 days If symptoms worsen [address]  [phone]          Return to ED if worse     Diagnosis     Clinical Impression:   1. Spinal stenosis of lumbar region with neurogenic claudication    2. Chronic back pain, unspecified back location, unspecified back pain laterality        Attestations: This note is prepared by Lloyd Mcgowan, acting as Scribe for MD Logan Arrieta MD: The scribe's documentation has been prepared under my direction and personally reviewed by me in its entirety. I confirm that the note above accurately reflects all work, treatment, procedures, and medical decision making performed by me.

## 2018-02-11 NOTE — DISCHARGE INSTRUCTIONS
Back Pain: Care Instructions  Your Care Instructions    Back pain has many possible causes. It is often related to problems with muscles and ligaments of the back. It may also be related to problems with the nerves, discs, or bones of the back. Moving, lifting, standing, sitting, or sleeping in an awkward way can strain the back. Sometimes you don't notice the injury until later. Arthritis is another common cause of back pain. Although it may hurt a lot, back pain usually improves on its own within several weeks. Most people recover in 12 weeks or less. Using good home treatment and being careful not to stress your back can help you feel better sooner. Follow-up care is a key part of your treatment and safety. Be sure to make and go to all appointments, and call your doctor if you are having problems. It's also a good idea to know your test results and keep a list of the medicines you take. How can you care for yourself at home? · Sit or lie in positions that are most comfortable and reduce your pain. Try one of these positions when you lie down:  ¨ Lie on your back with your knees bent and supported by large pillows. ¨ Lie on the floor with your legs on the seat of a sofa or chair. Donnel Seller on your side with your knees and hips bent and a pillow between your legs. ¨ Lie on your stomach if it does not make pain worse. · Do not sit up in bed, and avoid soft couches and twisted positions. Bed rest can help relieve pain at first, but it delays healing. Avoid bed rest after the first day of back pain. · Change positions every 30 minutes. If you must sit for long periods of time, take breaks from sitting. Get up and walk around, or lie in a comfortable position. · Try using a heating pad on a low or medium setting for 15 to 20 minutes every 2 or 3 hours. Try a warm shower in place of one session with the heating pad. · You can also try an ice pack for 10 to 15 minutes every 2 to 3 hours.  Put a thin cloth between the ice pack and your skin. · Take pain medicines exactly as directed. ¨ If the doctor gave you a prescription medicine for pain, take it as prescribed. ¨ If you are not taking a prescription pain medicine, ask your doctor if you can take an over-the-counter medicine. · Take short walks several times a day. You can start with 5 to 10 minutes, 3 or 4 times a day, and work up to longer walks. Walk on level surfaces and avoid hills and stairs until your back is better. · Return to work and other activities as soon as you can. Continued rest without activity is usually not good for your back. · To prevent future back pain, do exercises to stretch and strengthen your back and stomach. Learn how to use good posture, safe lifting techniques, and proper body mechanics. When should you call for help? Call your doctor now or seek immediate medical care if:  ? · You have new or worsening numbness in your legs. ? · You have new or worsening weakness in your legs. (This could make it hard to stand up.)   ? · You lose control of your bladder or bowels. ? Watch closely for changes in your health, and be sure to contact your doctor if:  ? · Your pain gets worse. ? · You are not getting better after 2 weeks. Where can you learn more? Go to http://mimi-ludivina.info/. Enter V705 in the search box to learn more about \"Back Pain: Care Instructions. \"  Current as of: March 21, 2017  Content Version: 11.4  © 7615-8312 [employer]. Care instructions adapted under license by [employer] (which disclaims liability or warranty for this information). If you have questions about a medical condition or this instruction, always ask your healthcare professional. [place] [address] any warranty or liability for your use of this information. Learning About Relief for Back Pain  What is back tension and strain?     Back strain happens when you overstretch, or pull, a muscle in your back. You may hurt your back in an accident or when you exercise or lift something. Most back pain will get better with rest and time. You can take care of yourself at home to help your back heal.  What can you do first to relieve back pain? When you first feel back pain, try these steps:  · Walk. Take a short walk (10 to 20 minutes) on a level surface (no slopes, hills, or stairs) every 2 to 3 hours. Walk only distances you can manage without pain, especially leg pain. · Relax. Find a comfortable position for rest. Some people are comfortable on the floor or a medium-firm bed with a small pillow under their head and another under their knees. Some people prefer to lie on their side with a pillow between their knees. Don't stay in one position for too long. · Try heat or ice. Try using a heating pad on a low or medium setting, or take a warm shower, for 15 to 20 minutes every 2 to 3 hours. Or you can buy single-use heat wraps that last up to 8 hours. You can also try an ice pack for 10 to 15 minutes every 2 to 3 hours. You can use an ice pack or a bag of frozen vegetables wrapped in a thin towel. There is not strong evidence that either heat or ice will help, but you can try them to see if they help. You may also want to try switching between heat and cold. · Take pain medicine exactly as directed. ¨ If the doctor gave you a prescription medicine for pain, take it as prescribed. ¨ If you are not taking a prescription pain medicine, ask your doctor if you can take an over-the-counter medicine. What else can you do? · Stretch and exercise. Exercises that increase flexibility may relieve your pain and make it easier for your muscles to keep your spine in a good, neutral position. And don't forget to keep walking. · Do self-massage. You can use self-massage to unwind after work or school or to energize yourself in the morning. You can easily massage your feet, hands, or neck. Self-massage works best if you are in comfortable clothes and are sitting or lying in a comfortable position. Use oil or lotion to massage bare skin. · Reduce stress. Back pain can lead to a vicious [place]: Distress about the pain tenses the muscles in your back, which in turn causes more pain. Learn how to relax your mind and your muscles to lower your stress. Where can you learn more? Go to http://mimi-ludivina.info/. Enter V799 in the search box to learn more about \"Learning About Relief for Back Pain. \"  Current as of: March 21, 2017  Content Version: 11.4  © 4194-9275 [employer], [employer]. Care instructions adapted under license by [employer] (which disclaims liability or warranty for this information). If you have questions about a medical condition or this instruction, always ask your healthcare professional. [address] any warranty or liability for your use of this information.

## 2018-02-11 NOTE — ED NOTES
Reviewed discharge instructions, follow up information with patient. No new medications. Ambulatory with rollator out of ED. Patient has been instructed that they have been given Percocet which contains opioids, benzodiazepines, or other sedating drugs. Patient is aware that they  will need to refrain from driving or operating heavy machinery after taking this medication. Patient also instructed that they need to avoid drinking alcohol and using other products containing opioids, benzodiazepines, or other sedating drugs. Patient verbalized understanding. Patient discharged home with ride.

## 2018-03-06 ENCOUNTER — OFFICE VISIT (OUTPATIENT)
Dept: INTERNAL MEDICINE CLINIC | Facility: CLINIC | Age: 58
End: 2018-03-06

## 2018-03-06 VITALS
BODY MASS INDEX: 27.34 KG/M2 | HEIGHT: 67 IN | WEIGHT: 174.2 LBS | SYSTOLIC BLOOD PRESSURE: 108 MMHG | RESPIRATION RATE: 18 BRPM | TEMPERATURE: 97.7 F | HEART RATE: 78 BPM | DIASTOLIC BLOOD PRESSURE: 70 MMHG

## 2018-03-06 DIAGNOSIS — Z12.39 BREAST CANCER SCREENING: ICD-10-CM

## 2018-03-06 DIAGNOSIS — M54.9 CHRONIC BACK PAIN, UNSPECIFIED BACK LOCATION, UNSPECIFIED BACK PAIN LATERALITY: ICD-10-CM

## 2018-03-06 DIAGNOSIS — L30.9 ECZEMA, UNSPECIFIED TYPE: ICD-10-CM

## 2018-03-06 DIAGNOSIS — F32.A DEPRESSION, UNSPECIFIED DEPRESSION TYPE: ICD-10-CM

## 2018-03-06 DIAGNOSIS — Z11.59 NEED FOR HEPATITIS C SCREENING TEST: ICD-10-CM

## 2018-03-06 DIAGNOSIS — G89.29 CHRONIC BACK PAIN, UNSPECIFIED BACK LOCATION, UNSPECIFIED BACK PAIN LATERALITY: ICD-10-CM

## 2018-03-06 DIAGNOSIS — Z12.11 COLON CANCER SCREENING: ICD-10-CM

## 2018-03-06 DIAGNOSIS — I10 ESSENTIAL HYPERTENSION: ICD-10-CM

## 2018-03-06 DIAGNOSIS — M81.0 OSTEOPOROSIS, UNSPECIFIED OSTEOPOROSIS TYPE, UNSPECIFIED PATHOLOGICAL FRACTURE PRESENCE: ICD-10-CM

## 2018-03-06 DIAGNOSIS — Z72.0 TOBACCO ABUSE: ICD-10-CM

## 2018-03-06 DIAGNOSIS — Z00.00 MEDICARE ANNUAL WELLNESS VISIT, INITIAL: Primary | ICD-10-CM

## 2018-03-06 DIAGNOSIS — Z12.4 PAP SMEAR FOR CERVICAL CANCER SCREENING: ICD-10-CM

## 2018-03-06 RX ORDER — GABAPENTIN 600 MG/1
600 TABLET ORAL 3 TIMES DAILY
Qty: 90 TAB | Refills: 6 | Status: SHIPPED | OUTPATIENT
Start: 2018-03-06 | End: 2018-10-13 | Stop reason: SDUPTHER

## 2018-03-06 RX ORDER — LOSARTAN POTASSIUM AND HYDROCHLOROTHIAZIDE 25; 100 MG/1; MG/1
TABLET ORAL
Qty: 90 TAB | Refills: 1 | Status: SHIPPED | OUTPATIENT
Start: 2018-03-06 | End: 2018-12-04 | Stop reason: SDUPTHER

## 2018-03-06 NOTE — MR AVS SNAPSHOT
[address] [place] [address] 
[phone] Patient: Otilia Morales MRN: [id number] OYP:[birth year] Visit Information Date & Time Provider Department Dept. Phone Encounter #  
 3/6/2018  9:45 AM Amanda Stroud MD [address] [place] [address] Internal Medicine [phone] [id number] Follow-up Instructions Return in about 3 months (around 6/6/2018) for follow up, bp check. Upcoming Health Maintenance Date Due Hepatitis C Screening 1960 Pneumococcal 19-64 Medium Risk (1 of 1 - PPSV23) 8/22/1977 DTaP/Tdap/Td series (1 - Tdap) 8/22/1979 PAP AKA CERVICAL CYTOLOGY 8/22/1979 BREAST CANCER SCRN MAMMOGRAM 8/22/2008 FOBT Q 1 YEAR AGE 50-75 8/22/2008 Influenza Age 5 to Adult 8/1/2017 Allergies as of 3/6/2018  Review Complete On: 3/6/2018 By: Aman Chinchilla LPN Severity Noted Reaction Type Reactions Other Medication  06/18/2015    Other (comments) Pt stated it caused her stomach to hurt when she took the PO dilaudid but has been given it IV with no reaction. Current Immunizations  Reviewed on 12/18/2015 Name Date Influenza Vaccine (Trivalent) 12/18/2015 Not reviewed this visit You Were Diagnosed With   
  
 Codes Comments Medicare annual wellness visit, initial    -  Primary ICD-10-CM: Z00.00 ICD-9-CM: V70.0 Essential hypertension     ICD-10-CM: I10 
ICD-9-CM: 401.9 Chronic back pain, unspecified back location, unspecified back pain laterality     ICD-10-CM: M54.9, G89.29 ICD-9-CM: 724.5, 338.29 Eczema, unspecified type     ICD-10-CM: L30.9 ICD-9-CM: 692.9 Depression, unspecified depression type     ICD-10-CM: F32.9 ICD-9-CM: 359 Tobacco abuse     ICD-10-CM: Z72.0 ICD-9-CM: 305.1 Colon cancer screening     ICD-10-CM: Z12.11 ICD-9-CM: V76.51 Osteoporosis, unspecified osteoporosis type, unspecified pathological fracture presence     ICD-10-CM: M81.0 ICD-9-CM: 733.00 Pap smear for cervical cancer screening     ICD-10-CM: Z12.4 ICD-9-CM: V76.2 Breast cancer screening     ICD-10-CM: Z12.31 
ICD-9-CM: V76.10 Need for hepatitis C screening test     ICD-10-CM: Z11.59 
ICD-9-CM: V73.89 Vitals BP Pulse Resp Height(growth percentile) Weight(growth percentile) BMI  
 108/70 78 18 5' 7.2\" (1.707 m) 174 lb 3.2 oz (79 kg) 27.12 kg/m2 OB Status Smoking Status Postmenopausal Current Every Day Smoker Vitals History BMI and BSA Data Body Mass Index Body Surface Area  
 27.12 kg/m 2 1.94 m 2 Preferred Pharmacy Pharmacy Name Phone [facility] [id number] [place] [address] [phone] Your Updated Medication List  
  
   
This list is accurate as of 3/6/18 10:57 AM.  Always use your most recent med list.  
  
  
  
  
 albuterol 90 mcg/actuation inhaler Commonly known as:  PROVENTIL HFA, VENTOLIN HFA, PROAIR HFA Take 1 Puff by inhalation every four (4) hours as needed for Wheezing. amLODIPine 10 mg tablet Commonly known as:  NORVASC  
take 1 tablet by mouth once daily  
  
 cyclobenzaprine 10 mg tablet Commonly known as:  FLEXERIL Take 1 Tab by mouth three (3) times daily as needed for Muscle Spasm(s). gabapentin 600 mg tablet Commonly known as:  NEURONTIN Take 1 Tab by mouth three (3) times daily. losartan-hydroCHLOROthiazide 100-25 mg per tablet Commonly known as:  HYZAAR  
take 1 tablet by mouth once daily  
  
 oxyCODONE IR 15 mg immediate release tablet Commonly known as:  OXY-IR  
  
 sertraline 50 mg tablet Commonly known as:  ZOLOFT  
take 1 tablet by mouth once daily  
  
 triamcinolone acetonide 0.025 % topical cream  
Commonly known as:  KENALOG  
apply to affected area once daily Prescriptions Sent to Pharmacy  Refills  
 gabapentin (NEURONTIN) 600 mg tablet 6  
 Sig: Take 1 Tab by mouth three (3) times daily. Class: Normal  
 Pharmacy: [address] [place] [address] [place] Ph #: [phone] Route: Oral  
 losartan-hydroCHLOROthiazide (HYZAAR) 100-25 mg per tablet 1 Sig: take 1 tablet by mouth once daily Class: Normal  
 Pharmacy: [address] [place] [address] [place] Ph #: [phone] We Performed the Following HEPATITIS C AB [30877 CPT(R)] OCCULT BLOOD, IMMUNOASSAY (FIT) [id number] CPT(R)] REFERRAL TO GYNECOLOGY [REF30 Custom] Comments:  
 Or  
[facility] 
[address] [facility], [address] Phone: [phone] Or [address] [place] [address] [phone] Phone: [phone] Follow-up Instructions Return in about 3 months (around 6/6/2018) for follow up, bp check. To-Do List   
 03/28/2018 Imaging:  DEXA BONE DENSITY STUDY AXIAL   
  
 03/28/2018 Imaging:  AUDREY MAMMO BI SCREENING INCL CAD Referral Information Referral ID Referred By Referred To  
  
 [id number] Christo Trujillo MD   
   [address] [place] [address] [address] [facility] [address] Phone: [phone] Fax: [phone] Visits Status Start Date End Date 1 New Request 3/6/18 3/6/19 If your referral has a status of pending review or denied, additional information will be sent to support the outcome of this decision. Patient Instructions Medicare Wellness Visit, Female The best way to live healthy is to have a healthy lifestyle by eating a well-balanced diet, exercising regularly, limiting alcohol and stopping smoking. Regular physical exams and screening tests are another way to keep healthy. Preventive exams provided by your health care provider can find health problems before they become diseases or illnesses.  Preventive services including immunizations, screening tests, monitoring and exams can help you take care of your own health. All people over age 72 should have a pneumovax  and and a prevnar shot to prevent pneumonia. These are once in a lifetime unless you and your provider decide differently. All people over 65 should have a yearly flu shot and a tetanus vaccine every 10 years. A bone mass density to screen for osteoporosis or thinning of the bones should be done every 2 years after 65. Screening for diabetes mellitus with a blood sugar test should be done every year. Glaucoma is a disease of the eye due to increased ocular pressure that can lead to blindness and it should be done every year by an eye professional. 
 
Cardiovascular screening tests that check for elevated lipids (fatty part of blood) which can lead to heart disease and strokes should be done every 5 years. Colorectal screening that evaluates for blood or polyps in your colon should be done yearly as a stool test or every five years as a flexible sigmoidoscope or every 10 years as a colonoscopy up to age 76. Breast cancer screening with a mammogram is recommended biennially  for women age 54-69. Screening for cervical cancer with a pap smear and pelvic exam is recommended for women after age 72 years every 2 years up to age 79 or when the provider and patient decide to stop. If there is a history of cervical abnormalities or other increased risk for cancer then the test is recommended yearly. Hepatitis C screening is also recommended for anyone born between 80 through [address]. A shingles vaccine is also recommended once in a lifetime after age 61. Your Medicare Wellness Exam is recommended annually. Here is a list of your current Health Maintenance items with a due date: 
Health Maintenance Due Topic Date Due  
 Hepatitis C Test  1960  Pneumococcal Vaccine (1 of 1 - PPSV23) 08/22/1977  DTaP/Tdap/Td  (1 - Tdap) 08/22/1979  Cervical Cancer Screening  08/22/1979  Breast Cancer Screening  08/22/2008  Stool testing for trace blood  08/22/2008  Flu Vaccine  08/01/2017 [facility] SERVICES! Dear Tova Osman: Thank you for requesting a [employer] account. Our records indicate that you already have an active [employer] account. You can access your account anytime at [email or web address] Did you know that you can access your hospital and ER discharge instructions at any time in [employer]? You can also review all of your test results from your hospital stay or ER visit. Additional Information If you have questions, please visit the Frequently Asked Questions section of the [employer] website at [email or web address] Remember, [employer] is NOT to be used for urgent needs. For medical emergencies, dial 911. Now available from your iPhone and Android! Please provide this summary of care documentation to your next provider. Your primary care clinician is listed as Lisa Sutherland. If you have any questions after today's visit, please call [phone].

## 2018-03-06 NOTE — PROGRESS NOTES
Chief Complaint   Patient presents with   [employer] Annual Wellness Visit     1. Have you been to the ER, urgent care clinic since your last visit? Hospitalized since your last visit? Yes When: 2/11/18 Where: [facility] Reason for visit: back pain    2. Have you seen or consulted any other health care providers outside of the [address] [place] [address] since your last visit? Include any pap smears or colon screening.  No

## 2018-03-06 NOTE — PROGRESS NOTES
Subjective:      Quan Castro is a 59 y.o. female who presents today for   Chief Complaint   Patient presents with    Annual Wellness Visit     Hypertension- compliant with meds, denies side effects      Chronic back pain-sees pain management.      Depression-  Taking Zoloft 50 mg 1 tab po daily, tolerating well.       Tobacco abuse -patient still smoking She would like to quit. However states smoking cigarettes helps her deal with stress.      Obesity has been working on weight loss. Weight is unchanged from November. She tried to pay closer attention to diet over the holidays      Eczema- uses steroid cream regularly- works well for dryness and scaling of her hands    Patient Active Problem List    Diagnosis Date Noted    Spinal stenosis of lumbar region with neurogenic claudication 06/18/2015    Disc herniation 06/18/2015    Chronic back pain 09/17/2012    Joint pain 09/17/2012    Joint stiffness 09/17/2012    HTN (hypertension) 09/17/2012    Arthritis 09/17/2012     Current Outpatient Prescriptions   Medication Sig Dispense Refill    triamcinolone acetonide (KENALOG) 0.025 % topical cream apply to affected area once daily 30 g 2    amLODIPine (NORVASC) 10 mg tablet take 1 tablet by mouth once daily 90 Tab 1    losartan-hydroCHLOROthiazide (HYZAAR) 100-25 mg per tablet take 1 tablet by mouth once daily 90 Tab 1    sertraline (ZOLOFT) 50 mg tablet take 1 tablet by mouth once daily 30 Tab 6    gabapentin (NEURONTIN) 600 mg tablet Take 1 Tab by mouth three (3) times daily. 90 Tab 6    albuterol (PROVENTIL HFA, VENTOLIN HFA, PROAIR HFA) 90 mcg/actuation inhaler Take 1 Puff by inhalation every four (4) hours as needed for Wheezing. 1 Inhaler 1    cyclobenzaprine (FLEXERIL) 10 mg tablet Take 1 Tab by mouth three (3) times daily as needed for Muscle Spasm(s).  30 Tab 1    oxyCODONE IR (OXY-IR) 15 mg immediate release tablet        Allergies   Allergen Reactions    Other Medication Other (comments) Pt stated it caused her stomach to hurt when she took the PO dilaudid but has been given it IV with no reaction. Past Medical History:   Diagnosis Date    Chronic pain     low back pain    Constipation     Depression     Fibromyalgia     Hypertension     Osteoarthritis     Other ill-defined conditions(799.89)     chronic kidney infections    Second hand smoke exposure     Sickle cell trait ([address]     pt reports    Spinal stenosis     L5-S1 left moderate to severe     Past Surgical History:   Procedure Laterality Date    HX  SECTION      HX ORTHOPAEDIC      R wrist surgery and foot surgery    HX TUBAL LIGATION       Family History   Problem Relation Age of Onset    Hypertension Mother     Sickle Cell Trait Mother     Diabetes Mother     Stroke Mother      SEVERAL TIAS    Hypertension Father     Heart Attack Father     Heart Disease Father     Hypertension Other      sibling    Anesth Problems Neg Hx      Social History   Substance Use Topics    Smoking status: Current Every Day Smoker     Packs/day: 0.30     Years: 30.00     Types: Cigarettes    Smokeless tobacco: Never Used    Alcohol use No        Review of Systems    A comprehensive review of systems was negative except for that written in the HPI. Objective:     Visit Vitals    /70    Pulse 78    Resp 18    Ht 5' 7.2\" (1.707 m)    Wt 174 lb 3.2 oz (79 kg)    BMI 27.12 kg/m2     General:  Alert, cooperative, no distress, appears stated age. Head:  Normocephalic, without obvious abnormality, atraumatic. Eyes:  Conjunctivae/corneas clear. PERRL, EOMs intact. Fundi benign. Ears:  Normal TMs and external ear canals both ears. Nose: Nares normal. Septum midline. Mucosa normal. No drainage or sinus tenderness. Throat: Lips, mucosa, and tongue normal. Teeth and gums normal.   Neck: Supple, symmetrical, trachea midline, no adenopathy, thyroid: no enlargement/tenderness/nodules, no carotid bruit and no JVD. Back:   Symmetric, no curvature. ROM normal. No CVA tenderness. Lungs:   Clear to auscultation bilaterally. Chest wall:  No tenderness or deformity. Heart:  Regular rate and rhythm, S1, S2 normal, no murmur, click, rub or gallop. Abdomen:   Soft, non-tender. Bowel sounds normal. No masses,  No organomegaly. Extremities: Extremities normal, atraumatic, no cyanosis or edema. Pulses: 2+ and symmetric all extremities. Skin: Skin color, texture, turgor normal. No rashes or lesions. Lymph nodes: Cervical, supraclavicular, and axillary nodes normal.   Neurologic: CNII-XII intact. Normal strength, sensation and reflexes throughout. Assessment/Plan:   Hypertension- refill losartan- hctz, continue amlodipine    Chronic back pain- continue with pain management, refill gabapentin    Depression- continue zoloft        Follow-up Disposition: Not on File   Advised her to call back or return to office if symptoms worsen/change/persist.  Discussed expected course/resolution/complications of diagnosis in detail with patient. Medication risks/benefits/costs/interactions/alternatives discussed with patient. She was given an after visit summary which includes diagnoses, current medications, & vitals. She expressed understanding with the diagnosis and plan. This is an Initial Medicare Annual Wellness Exam (AWV) (Performed 12 months after IPPE or effective date of Medicare Part B enrollment, Once in a lifetime)    I have reviewed the patient's medical history in detail and updated the computerized patient record.      History     Past Medical History:   Diagnosis Date    Chronic pain     low back pain    Constipation     Depression     Fibromyalgia     Hypertension     Osteoarthritis     Other ill-defined conditions(799.89)     chronic kidney infections    Second hand smoke exposure     Sickle cell trait ([facility] [address]     pt reports    Spinal stenosis     L5-S1 left moderate to severe      Past Surgical History:   Procedure Laterality Date    HX  SECTION      HX ORTHOPAEDIC      R wrist surgery and foot surgery    HX TUBAL LIGATION       Current Outpatient Prescriptions   Medication Sig Dispense Refill    triamcinolone acetonide (KENALOG) 0.025 % topical cream apply to affected area once daily 30 g 2    amLODIPine (NORVASC) 10 mg tablet take 1 tablet by mouth once daily 90 Tab 1    losartan-hydroCHLOROthiazide (HYZAAR) 100-25 mg per tablet take 1 tablet by mouth once daily 90 Tab 1    sertraline (ZOLOFT) 50 mg tablet take 1 tablet by mouth once daily 30 Tab 6    gabapentin (NEURONTIN) 600 mg tablet Take 1 Tab by mouth three (3) times daily. 90 Tab 6    albuterol (PROVENTIL HFA, VENTOLIN HFA, PROAIR HFA) 90 mcg/actuation inhaler Take 1 Puff by inhalation every four (4) hours as needed for Wheezing. 1 Inhaler 1    cyclobenzaprine (FLEXERIL) 10 mg tablet Take 1 Tab by mouth three (3) times daily as needed for Muscle Spasm(s). 30 Tab 1    oxyCODONE IR (OXY-IR) 15 mg immediate release tablet        Allergies   Allergen Reactions    Other Medication Other (comments)     Pt stated it caused her stomach to hurt when she took the PO dilaudid but has been given it IV with no reaction.      Family History   Problem Relation Age of Onset    Hypertension Mother     Sickle Cell Trait Mother     Diabetes Mother     Stroke Mother      SEVERAL TIAS    Hypertension Father     Heart Attack Father     Heart Disease Father     Hypertension Other      sibling    Anesth Problems Neg Hx      Social History   Substance Use Topics    Smoking status: Current Every Day Smoker     Packs/day: 0.30     Years: 30.00     Types: Cigarettes    Smokeless tobacco: Never Used    Alcohol use No     Patient Active Problem List   Diagnosis Code    Chronic back pain M54.9, G89.29    Joint pain M25.50    Joint stiffness M25.60    HTN (hypertension) I10    Arthritis M19.90    Spinal stenosis of lumbar region with neurogenic claudication M48.062    Disc herniation [id number]       Depression Risk Factor Screening:     PHQ over the last two weeks 2/5/2018   Little interest or pleasure in doing things Nearly every day   Feeling down, depressed or hopeless Nearly every day   Total Score PHQ 2 6   feels depressed, sometimes hopeless        Has lost interest in things    Has agreed to see a counselor  Alcohol Risk Factor Screening:   handrails and grab bars    Functional Ability and Level of Safety:     Hearing Loss  Hearing is good. Activities of Daily Living  The home contains: handrails and grab bars  Patient does total self care    Fall Risk  Fall Risk Assessment, last 12 mths 2/5/2018   Able to walk? Yes   Fall in past 12 months? Yes   Fall with injury? No   Number of falls in past 12 months 1   Fall Risk Score        Abuse Screen  Patient is not abused    Cognitive Screening   Evaluation of Cognitive Function:  Has your family/caregiver stated any concerns about your memory: no  Normal    Patient Care Team   Patient Care Team:  Vern Schmid MD as PCP - General (Internal Medicine)    Assessment/Plan   Education and counseling provided:  Are appropriate based on today's review and evaluation  Pneumococcal Vaccine due at age 70  Influenza Vaccine flu utd 2017  Screening Mammography  Will give order today  Screening Pap and pelvic (covered once every 2 years)  Referral to Dr. Edwin Ring  Colorectal cancer screening tests- refuses colonoscopy/ FIT test  Bone mass measurement (DEXA)- patient has hx of osteoporosis  Screening for glaucoma- saw ophthalmologist last year  Diabetes screening test   2/5/18   A1c 5.4      Diagnoses and all orders for this visit:    1. Medicare annual wellness visit, initial  -     OCCULT BLOOD, IMMUNOASSAY (FIT)  -     REFERRAL TO GYNECOLOGY  -     AUDREY MAMMO BI SCREENING INCL CAD; Future  -     DEXA BONE DENSITY STUDY AXIAL; Future  -     HEPATITIS C AB    2. Essential hypertension    3.  Chronic back pain, unspecified back location, unspecified back pain laterality    4. Eczema, unspecified type    5. Depression, unspecified depression type    6. Tobacco abuse    7. Colon cancer screening  -     OCCULT BLOOD, IMMUNOASSAY (FIT)    8. Osteoporosis, unspecified osteoporosis type, unspecified pathological fracture presence  -     DEXA BONE DENSITY STUDY AXIAL; Future    9. Pap smear for cervical cancer screening  -     REFERRAL TO GYNECOLOGY    10. Breast cancer screening  -     AUDREY MAMMO BI SCREENING INCL CAD; Future    11. Need for hepatitis C screening test  -     HEPATITIS C AB    Other orders  -     gabapentin (NEURONTIN) 600 mg tablet; Take 1 Tab by mouth three (3) times daily.   -     losartan-hydroCHLOROthiazide (HYZAAR) 100-25 mg per tablet; take 1 tablet by mouth once daily       Health Maintenance Due   Topic Date Due    Hepatitis C Screening  1960    Pneumococcal 19-64 Medium Risk (1 of 1 - PPSV23) 08/22/1977    DTaP/Tdap/Td series (1 - Tdap) 08/22/1979    PAP AKA CERVICAL CYTOLOGY  08/22/1979    BREAST CANCER SCRN MAMMOGRAM  08/22/2008    FOBT Q 1 YEAR AGE 50-75  08/22/2008    Influenza Age 9 to Adult  08/01/2017

## 2018-03-06 NOTE — PATIENT INSTRUCTIONS

## 2018-03-07 LAB — HCV AB S/CO SERPL IA: <0.1 S/CO RATIO (ref 0–0.9)

## 2018-06-10 RX ORDER — SERTRALINE HYDROCHLORIDE 50 MG/1
TABLET, FILM COATED ORAL
Qty: 30 TAB | Refills: 6 | Status: SHIPPED | OUTPATIENT
Start: 2018-06-10 | End: 2019-01-07 | Stop reason: SDUPTHER

## 2018-06-14 RX ORDER — TRIAMCINOLONE ACETONIDE 0.25 MG/G
CREAM TOPICAL
Qty: 30 G | Refills: 2 | Status: SHIPPED | OUTPATIENT
Start: 2018-06-14

## 2018-07-27 ENCOUNTER — OFFICE VISIT (OUTPATIENT)
Dept: INTERNAL MEDICINE CLINIC | Facility: CLINIC | Age: 58
End: 2018-07-27

## 2018-07-27 VITALS
DIASTOLIC BLOOD PRESSURE: 79 MMHG | SYSTOLIC BLOOD PRESSURE: 138 MMHG | RESPIRATION RATE: 18 BRPM | BODY MASS INDEX: 27.15 KG/M2 | TEMPERATURE: 97.7 F | HEIGHT: 67 IN | WEIGHT: 173 LBS | HEART RATE: 62 BPM

## 2018-07-27 DIAGNOSIS — J02.9 SORE THROAT: Primary | ICD-10-CM

## 2018-07-27 DIAGNOSIS — K08.9 POOR DENTITION: ICD-10-CM

## 2018-07-27 DIAGNOSIS — R59.9 ENLARGED GLANDS: ICD-10-CM

## 2018-07-27 LAB
MONONUCLEOSIS SCREEN POC: NEGATIVE
S PYO AG THROAT QL: NEGATIVE
VALID INTERNAL CONTROL?: YES
VALID INTERNAL CONTROL?: YES

## 2018-07-27 RX ORDER — AMOXICILLIN AND CLAVULANATE POTASSIUM 875; 125 MG/1; MG/1
1 TABLET, FILM COATED ORAL EVERY 12 HOURS
Qty: 20 TAB | Refills: 0 | Status: SHIPPED | OUTPATIENT
Start: 2018-07-27 | End: 2019-02-08

## 2018-07-27 RX ORDER — PREDNISONE 20 MG/1
20 TABLET ORAL 2 TIMES DAILY
Qty: 10 TAB | Refills: 0 | Status: SHIPPED | OUTPATIENT
Start: 2018-07-27 | End: 2019-02-08

## 2018-07-27 NOTE — MR AVS SNAPSHOT
[address] [place] [address] 
[phone] Patient: Jae Lagunas MRN: [id number] JLT:[id number]/78/[id number] Visit Information Date & Time Provider Department Dept. Phone Encounter #  
 7/27/2018 10:45 AM Jewel Leon  [facility] [phone] [id number] Follow-up Instructions Return if symptoms worsen or fail to improve, for follow up. Upcoming Health Maintenance Date Due Pneumococcal 19-64 Medium Risk (1 of 1 - PPSV23) 8/22/1977 DTaP/Tdap/Td series (1 - Tdap) 8/22/1979 PAP AKA CERVICAL CYTOLOGY 8/22/1979 BREAST CANCER SCRN MAMMOGRAM 8/22/2008 FOBT Q 1 YEAR AGE 50-75 8/22/2008 ZOSTER VACCINE AGE 60> 6/22/2018 Influenza Age 5 to Adult 8/1/2018 MEDICARE YEARLY EXAM 3/7/2019 Allergies as of 7/27/2018  Review Complete On: 7/27/2018 By: Alta Wilder LPN Severity Noted Reaction Type Reactions Other Medication  06/18/2015    Other (comments) Pt stated it caused her stomach to hurt when she took the PO dilaudid but has been given it IV with no reaction. Current Immunizations  Reviewed on 12/18/2015 Name Date Influenza Vaccine (Trivalent) 12/18/2015 Not reviewed this visit You Were Diagnosed With   
  
 Codes Comments Sore throat    -  Primary ICD-10-CM: J02.9 ICD-9-CM: 639 Enlarged glands     ICD-10-CM: R59.9 ICD-9-CM: 071. 6 Poor dentition     ICD-10-CM: K08.9 ICD-9-CM: 525.9 Vitals BP Pulse Temp Resp Height(growth percentile) Weight(growth percentile) 138/79 62 97.7 °F (36.5 °C) (Oral) 18 5' 7.2\" (1.707 m) 173 lb (78.5 kg) BMI OB Status Smoking Status 26.93 kg/m2 Postmenopausal Current Every Day Smoker Vitals History BMI and BSA Data Body Mass Index Body Surface Area  
 26.93 kg/m 2 1.93 m 2 Preferred Pharmacy Pharmacy Name Phone [facility], [address] [facility]. [phone] Your Updated Medication List  
  
   
This list is accurate as of 7/27/18 11:47 AM.  Always use your most recent med list.  
  
  
  
  
 albuterol 90 mcg/actuation inhaler Commonly known as:  PROVENTIL HFA, VENTOLIN HFA, PROAIR HFA Take 1 Puff by inhalation every four (4) hours as needed for Wheezing. amLODIPine 10 mg tablet Commonly known as:  NORVASC  
take 1 tablet by mouth once daily  
  
 amoxicillin-clavulanate 875-125 mg per tablet Commonly known as:  AUGMENTIN Take 1 Tab by mouth every twelve (12) hours. cyclobenzaprine 10 mg tablet Commonly known as:  FLEXERIL Take 1 Tab by mouth three (3) times daily as needed for Muscle Spasm(s). gabapentin 600 mg tablet Commonly known as:  NEURONTIN Take 1 Tab by mouth three (3) times daily. losartan-hydroCHLOROthiazide 100-25 mg per tablet Commonly known as:  HYZAAR  
take 1 tablet by mouth once daily  
  
 oxyCODONE IR 15 mg immediate release tablet Commonly known as:  OXY-IR  
  
 predniSONE 20 mg tablet Commonly known as:  Marcelene Im Take 1 Tab by mouth two (2) times a day. sertraline 50 mg tablet Commonly known as:  ZOLOFT  
take 1 tablet by mouth once daily  
  
 triamcinolone acetonide 0.025 % topical cream  
Commonly known as:  KENALOG  
apply to affected area once daily Prescriptions Sent to Pharmacy Refills  
 amoxicillin-clavulanate (AUGMENTIN) 875-125 mg per tablet 0 Sig: Take 1 Tab by mouth every twelve (12) hours. Class: Normal  
 Pharmacy: [address] [place] [address]. Ph #: [phone] Route: Oral  
 predniSONE (DELTASONE) 20 mg tablet 0 Sig: Take 1 Tab by mouth two (2) times a day. Class: Normal  
 Pharmacy: [address] [place] [address]. Ph #: [phone] Route: Oral  
  
We Performed the Following AMB POC RAPID STREP A [09719 CPT(R)] POC HETEROPHILE ANTIBODY SCREEN [69438 CPT(R)] Follow-up Instructions Return if symptoms worsen or fail to improve, for follow up. Introducing [facility] SERVICES! Dear Magnus Bernal: Thank you for requesting a [employer] account. Our records indicate that you already have an active [employer] account. You can access your account anytime at [email or web address] Did you know that you can access your hospital and ER discharge instructions at any time in [employer]? You can also review all of your test results from your hospital stay or ER visit. Additional Information If you have questions, please visit the Frequently Asked Questions section of the [employer] website at [email or web address] Remember, [employer] is NOT to be used for urgent needs. For medical emergencies, dial 911. Now available from your iPhone and Android! Please provide this summary of care documentation to your next provider. Your primary care clinician is listed as Azael Zhu. If you have any questions after today's visit, please call [phone].

## 2018-07-27 NOTE — PROGRESS NOTES
Chief Complaint Patient presents with  
 Hoarse  
  sore throat for the last week that has gotten worse, glands swollen and tender, hurts to open her mouth, and swollow. (left side) 1. Have you been to the ER, urgent care clinic since your last visit? Hospitalized since your last visit? No 
 
2. Have you seen or consulted any other health care providers outside of the [address] [place] [address] since your last visit? Include any pap smears or colon screening.  No

## 2018-07-27 NOTE — PROGRESS NOTES
Subjective:  
  
Omaira Okeefe is a 59 y.o. female who presents today for Chief Complaint Patient presents with  
 Hoarse  
  sore throat for the last week that has gotten worse, glands swollen and tender, hurts to open her mouth, and swollow. (left side) Tongue and throat started hurting left side about a week ago. She says pain is severe and hurts to eat and swallow. Mild cough, denies fever and chills She does have a history of dental issues but says pain does not seem to be from her gumline or mouth. She is able to swallow soft solids and liquids. She says she is staying hydrated. She denies any breathing difficulty. Does not feel like her throat is closing. Patient Active Problem List  
 Diagnosis Date Noted  Spinal stenosis of lumbar region with neurogenic claudication 06/18/2015  Disc herniation 06/18/2015  Chronic back pain 09/17/2012  Joint pain 09/17/2012  Joint stiffness 09/17/2012  
 HTN (hypertension) 09/17/2012  Arthritis 09/17/2012 Current Outpatient Prescriptions Medication Sig Dispense Refill  triamcinolone acetonide (KENALOG) 0.025 % topical cream apply to affected area once daily 30 g 2  
 sertraline (ZOLOFT) 50 mg tablet take 1 tablet by mouth once daily 30 Tab 6  
 gabapentin (NEURONTIN) 600 mg tablet Take 1 Tab by mouth three (3) times daily. 90 Tab 6  
 losartan-hydroCHLOROthiazide (HYZAAR) 100-25 mg per tablet take 1 tablet by mouth once daily 90 Tab 1  
 amLODIPine (NORVASC) 10 mg tablet take 1 tablet by mouth once daily 90 Tab 1  
 albuterol (PROVENTIL HFA, VENTOLIN HFA, PROAIR HFA) 90 mcg/actuation inhaler Take 1 Puff by inhalation every four (4) hours as needed for Wheezing. 1 Inhaler 1  cyclobenzaprine (FLEXERIL) 10 mg tablet Take 1 Tab by mouth three (3) times daily as needed for Muscle Spasm(s). 30 Tab 1  
 oxyCODONE IR (OXY-IR) 15 mg immediate release tablet Allergies Allergen Reactions  Other Medication Other (comments) Pt stated it caused her stomach to hurt when she took the PO dilaudid but has been given it IV with no reaction. Past Medical History:  
Diagnosis Date  Chronic pain   
 low back pain  Constipation  Depression  Fibromyalgia  Hypertension  Osteoarthritis  Other ill-defined conditions(799.89) chronic kidney infections  Second hand smoke exposure  Sickle cell trait ([facility] [address]   
 pt reports  Spinal stenosis L5-S1 left moderate to severe Past Surgical History:  
Procedure Laterality Date  HX  SECTION    
 HX ORTHOPAEDIC    
 R wrist surgery and foot surgery  HX TUBAL LIGATION Family History Problem Relation Age of Onset  Hypertension Mother  Sickle Cell Trait Mother  Diabetes Mother  Stroke Mother SEVERAL TIAS  
 Hypertension Father  Heart Attack Father  Heart Disease Father  Hypertension Other   
  sibling  Anesth Problems Neg Hx Social History Substance Use Topics  Smoking status: Current Every Day Smoker Packs/day: 0.30 Years: 30.00 Types: Cigarettes  Smokeless tobacco: Never Used  Alcohol use No  
  
 
Review of Systems A comprehensive review of systems was negative except for that written in the HPI. Objective:  
 
Visit Vitals  Temp 97.7 °F (36.5 °C) (Oral)  Resp 18  Ht 5' 7.2\" (1.707 m)  Wt 173 lb (78.5 kg)  BMI 26.93 kg/m2 General:  Alert, cooperative, mild distress, crying secondary to pain 
appears stated age. Head:  Normocephalic, without obvious abnormality, atraumatic. Eyes:  Conjunctivae/corneas clear. PERRL, EOMs intact. Fundi benign. Ears:  Normal TMs and external ear canals both ears. Nose: Nares normal. Septum midline. Mucosa normal. No drainage or sinus tenderness. Throat: Very poor dentition, mild erythema oropharynx, patient having difficulty fully opening mouth Neck: Jan tender enlarged node left anterior cervical Back:   Symmetric, no curvature. ROM normal. No CVA tenderness. Lungs:   Clear to auscultation bilaterally. Chest wall:  No tenderness or deformity. Heart:  Regular rate and rhythm, S1, S2 normal, no murmur, click, rub or gallop. Extremities: Extremities normal, atraumatic, no cyanosis or edema. Pulses: 2+ and symmetric all extremities. Assessment/Plan: ICD-10-CM ICD-9-CM 1. Sore throat J02.9 462 AMB POC RAPID STREP A  
   POC HETEROPHILE ANTIBODY SCREEN 2. Enlarged glands R59.9 785.6 AMB POC RAPID STREP A  
   POC HETEROPHILE ANTIBODY SCREEN 3. Poor dentition K08.9 525.9 Strep and mono neg In light of enlarged lymph node and tenderness along lower left jaw may be dental abscess. Prescribed course of augmentin. Short course of prednisone. Make appt with dentist asap. To ER if symptoms worsen. Fever, chills, increased pain, unable to swallow or difficulty breathing Follow-up Disposition: Not on File Advised her to call back or return to office if symptoms worsen/change/persist. 
Discussed expected course/resolution/complications of diagnosis in detail with patient. Medication risks/benefits/costs/interactions/alternatives discussed with patient. She was given an after visit summary which includes diagnoses, current medications, & vitals. She expressed understanding with the diagnosis and plan.

## 2018-10-16 RX ORDER — GABAPENTIN 600 MG/1
TABLET ORAL
Qty: 90 TAB | Refills: 6 | Status: SHIPPED | OUTPATIENT
Start: 2018-10-16 | End: 2019-07-20 | Stop reason: SDUPTHER

## 2018-12-04 RX ORDER — LOSARTAN POTASSIUM AND HYDROCHLOROTHIAZIDE 25; 100 MG/1; MG/1
TABLET ORAL
Qty: 90 TAB | Refills: 1 | Status: SHIPPED | OUTPATIENT
Start: 2018-12-04 | End: 2019-05-20 | Stop reason: SDUPTHER

## 2018-12-04 RX ORDER — AMLODIPINE BESYLATE 10 MG/1
TABLET ORAL
Qty: 90 TAB | Refills: 1 | Status: SHIPPED | OUTPATIENT
Start: 2018-12-04 | End: 2019-05-20 | Stop reason: SDUPTHER

## 2019-01-08 RX ORDER — SERTRALINE HYDROCHLORIDE 50 MG/1
TABLET, FILM COATED ORAL
Qty: 90 TAB | Refills: 1 | Status: SHIPPED | OUTPATIENT
Start: 2019-01-08 | End: 2019-06-17 | Stop reason: SDUPTHER

## 2019-02-01 ENCOUNTER — OFFICE VISIT (OUTPATIENT)
Dept: INTERNAL MEDICINE CLINIC | Facility: CLINIC | Age: 59
End: 2019-02-01

## 2019-02-01 VITALS
SYSTOLIC BLOOD PRESSURE: 121 MMHG | RESPIRATION RATE: 16 BRPM | TEMPERATURE: 97.5 F | BODY MASS INDEX: 28.41 KG/M2 | HEIGHT: 67 IN | WEIGHT: 181 LBS | HEART RATE: 99 BPM | DIASTOLIC BLOOD PRESSURE: 77 MMHG

## 2019-02-01 DIAGNOSIS — M19.90 ARTHRITIS: ICD-10-CM

## 2019-02-01 DIAGNOSIS — M54.9 CHRONIC BACK PAIN, UNSPECIFIED BACK LOCATION, UNSPECIFIED BACK PAIN LATERALITY: ICD-10-CM

## 2019-02-01 DIAGNOSIS — I10 ESSENTIAL HYPERTENSION: Primary | ICD-10-CM

## 2019-02-01 DIAGNOSIS — R63.5 WEIGHT GAIN: ICD-10-CM

## 2019-02-01 DIAGNOSIS — G89.29 CHRONIC BACK PAIN, UNSPECIFIED BACK LOCATION, UNSPECIFIED BACK PAIN LATERALITY: ICD-10-CM

## 2019-02-01 DIAGNOSIS — R59.0 ENLARGED LYMPH NODE IN NECK: ICD-10-CM

## 2019-02-01 RX ORDER — AMOXICILLIN 500 MG/1
500 CAPSULE ORAL 3 TIMES DAILY
Qty: 30 CAP | Refills: 0 | Status: SHIPPED | OUTPATIENT
Start: 2019-02-01 | End: 2019-04-14

## 2019-02-01 RX ORDER — OXYCODONE 9 MG/1
CAPSULE, EXTENDED RELEASE ORAL
Refills: 0 | COMMUNITY
Start: 2019-01-22 | End: 2019-04-14

## 2019-02-01 NOTE — PROGRESS NOTES
Chief Complaint   Patient presents with    Hypertension     1. Have you been to the ER, urgent care clinic since your last visit? Hospitalized since your last visit? No    2. Have you seen or consulted any other health care providers outside of the [address] [place] [address] since your last visit? Include any pap smears or colon screening.  No

## 2019-02-01 NOTE — PROGRESS NOTES
Subjective:      Tyree Bear is a 59 y.o. female who presents today for   Chief Complaint   Patient presents with    Hypertension     Hypertension- compliant with meds, denies side effects  bp well controlled today      Chronic back pain-sees pain management. Is on a new pain medication which is working well  She sees Dr. José Naranjo Zoloft 50 mg 1 tab po daily, tolerating well.       Tobacco abuse -patient still smoking She would like to quit. However states smoking cigarettes helps her deal with stress.      Obesity has been working on weight loss.       Eczema- uses steroid cream regularly- works well for dryness and scaling of her hands    Flu and pneumovax are UTD       Patient Active Problem List    Diagnosis Date Noted    Spinal stenosis of lumbar region with neurogenic claudication 06/18/2015    Disc herniation 06/18/2015    Chronic back pain 09/17/2012    Joint pain 09/17/2012    Joint stiffness 09/17/2012    HTN (hypertension) 09/17/2012    Arthritis 09/17/2012     Current Outpatient Medications   Medication Sig Dispense Refill    XTAMPZA ER 9 mg capsule TK ONE C PO  BID  0    sertraline (ZOLOFT) 50 mg tablet take 1 tablet by mouth once daily 90 Tab 1    losartan-hydroCHLOROthiazide (HYZAAR) 100-25 mg per tablet take 1 tablet by mouth once daily 90 Tab 1    amLODIPine (NORVASC) 10 mg tablet take 1 tablet by mouth once daily 90 Tab 1    gabapentin (NEURONTIN) 600 mg tablet take 1 tablet by mouth three times a day 90 Tab 6    triamcinolone acetonide (KENALOG) 0.025 % topical cream apply to affected area once daily 30 g 2    albuterol (PROVENTIL HFA, VENTOLIN HFA, PROAIR HFA) 90 mcg/actuation inhaler Take 1 Puff by inhalation every four (4) hours as needed for Wheezing. 1 Inhaler 1    cyclobenzaprine (FLEXERIL) 10 mg tablet Take 1 Tab by mouth three (3) times daily as needed for Muscle Spasm(s).  30 Tab 1    oxyCODONE IR (OXY-IR) 15 mg immediate release tablet  amoxicillin-clavulanate (AUGMENTIN) 875-125 mg per tablet Take 1 Tab by mouth every twelve (12) hours. 20 Tab 0    predniSONE (DELTASONE) 20 mg tablet Take 1 Tab by mouth two (2) times a day. 10 Tab 0     Allergies   Allergen Reactions    Other Medication Other (comments)     Pt stated it caused her stomach to hurt when she took the PO dilaudid but has been given it IV with no reaction. Past Medical History:   Diagnosis Date    Chronic pain     low back pain    Constipation     Depression     Fibromyalgia     Hypertension     Osteoarthritis     Other ill-defined conditions(799.89)     chronic kidney infections    Second hand smoke exposure     Sickle cell trait ([facility] [address]     pt reports    Spinal stenosis     L5-S1 left moderate to severe     Past Surgical History:   Procedure Laterality Date    HX  SECTION      HX ORTHOPAEDIC      R wrist surgery and foot surgery    HX TUBAL LIGATION       Family History   Problem Relation Age of Onset    Hypertension Mother     Sickle Cell Trait Mother     Diabetes Mother     Stroke Mother         SEVERAL TIAS    Hypertension Father     Heart Attack Father     Heart Disease Father     Hypertension Other         sibling    Anesth Problems Neg Hx      Social History     Tobacco Use    Smoking status: Current Every Day Smoker     Packs/day: 0.30     Years: 30.00     Pack years: 9.00     Types: Cigarettes    Smokeless tobacco: Never Used   Substance Use Topics    Alcohol use: No        Review of Systems    A comprehensive review of systems was negative except for that written in the HPI. Objective:     Visit Vitals  /77   Pulse 99   Temp 97.5 °F (36.4 °C) (Oral)   Resp 16   Ht 5' 7.2\" (1.707 m)   Wt 181 lb (82.1 kg)   BMI 28.18 kg/m²     General:  Alert, cooperative, no distress, appears stated age. Head:  Normocephalic, without obvious abnormality, atraumatic. Eyes:  Conjunctivae/corneas clear. PERRL, EOMs intact. Fundi benign. Ears:  Normal TMs and external ear canals both ears. Nose: Nares normal. Septum midline. Mucosa normal. No drainage or sinus tenderness. Throat: Lips, mucosa, and tongue normal. Teeth and gums normal.   Neck: Supple, symmetrical, trachea midline, no adenopathy, thyroid: no enlargement/tenderness/nodules, no carotid bruit and no JVD. Back:   Symmetric, no curvature. ROM normal. No CVA tenderness. Lungs:   Clear to auscultation bilaterally. Chest wall:  No tenderness or deformity. Heart:  Regular rate and rhythm, S1, S2 normal, no murmur, click, rub or gallop. Abdomen:   Soft, non-tender. Bowel sounds normal. No masses,  No organomegaly. Extremities: Extremities normal, atraumatic, no cyanosis or edema. Pulses: 2+ and symmetric all extremities. Skin: Skin color, texture, turgor normal. No rashes or lesions. Lymph nodes: Cervical, supraclavicular, and axillary nodes normal.   Neurologic: CNII-XII intact. Normal strength, sensation and reflexes throughout. Assessment/Plan:       ICD-10-CM ICD-9-CM    1. Essential hypertension J90 540.5 METABOLIC PANEL, COMPREHENSIVE      CBC WITH AUTOMATED DIFF   2. Chronic back pain, unspecified back location, unspecified back pain laterality M54.9 724.5     G89.29 338.29 Follows with pain management   3. Arthritis M19.90 716.90 Follows with pain management   4. Weight gain R63.5 783.1 Diet and exercise   5. Enlarged lymph node in neck R59.0 785. 6 Amoxicillin 500 mg po q 8 hours x 10 days       Follow-up Disposition: Not on File   Advised her to call back or return to office if symptoms worsen/change/persist.  Discussed expected course/resolution/complications of diagnosis in detail with patient. Medication risks/benefits/costs/interactions/alternatives discussed with patient. She was given an after visit summary which includes diagnoses, current medications, & vitals. She expressed understanding with the diagnosis and plan.

## 2019-04-12 ENCOUNTER — OFFICE VISIT (OUTPATIENT)
Dept: INTERNAL MEDICINE CLINIC | Facility: CLINIC | Age: 59
End: 2019-04-12

## 2019-04-12 VITALS
RESPIRATION RATE: 16 BRPM | HEART RATE: 75 BPM | TEMPERATURE: 97.8 F | DIASTOLIC BLOOD PRESSURE: 62 MMHG | HEIGHT: 67 IN | SYSTOLIC BLOOD PRESSURE: 101 MMHG | WEIGHT: 184 LBS | BODY MASS INDEX: 28.88 KG/M2

## 2019-04-12 DIAGNOSIS — J02.9 SORE THROAT: ICD-10-CM

## 2019-04-12 DIAGNOSIS — R59.9 ENLARGED GLANDS: Primary | ICD-10-CM

## 2019-04-12 RX ORDER — AZITHROMYCIN 250 MG/1
250 TABLET, FILM COATED ORAL SEE ADMIN INSTRUCTIONS
Qty: 6 TAB | Refills: 1 | Status: SHIPPED | OUTPATIENT
Start: 2019-04-12 | End: 2019-04-17

## 2019-04-12 RX ORDER — OXYCODONE 18 MG/1
CAPSULE, EXTENDED RELEASE ORAL
Refills: 0 | COMMUNITY
Start: 2019-04-04

## 2019-04-12 NOTE — PROGRESS NOTES
Subjective:      Caden Bahena is a 59 y.o. female who presents today for   Chief Complaint   Patient presents with    Mass     states when she swallows her gland hurts and her voice is changing very haorse. Mild cough   Feels hoarse  Enlarged gland left neck  Hurts when she swallows    Similar symptoms back in feb, took course of amoxicillin but pain came back as well as swelling of gland in neck    Patient Active Problem List    Diagnosis Date Noted    Spinal stenosis of lumbar region with neurogenic claudication 06/18/2015    Disc herniation 06/18/2015    Chronic back pain 09/17/2012    Joint pain 09/17/2012    Joint stiffness 09/17/2012    HTN (hypertension) 09/17/2012    Arthritis 09/17/2012     Current Outpatient Medications   Medication Sig Dispense Refill    XTAMPZA ER 18 mg ER capsule TK ONE C PO  BID  0    sertraline (ZOLOFT) 50 mg tablet take 1 tablet by mouth once daily 90 Tab 1    losartan-hydroCHLOROthiazide (HYZAAR) 100-25 mg per tablet take 1 tablet by mouth once daily 90 Tab 1    amLODIPine (NORVASC) 10 mg tablet take 1 tablet by mouth once daily 90 Tab 1    gabapentin (NEURONTIN) 600 mg tablet take 1 tablet by mouth three times a day 90 Tab 6    triamcinolone acetonide (KENALOG) 0.025 % topical cream apply to affected area once daily 30 g 2    albuterol (PROVENTIL HFA, VENTOLIN HFA, PROAIR HFA) 90 mcg/actuation inhaler Take 1 Puff by inhalation every four (4) hours as needed for Wheezing. 1 Inhaler 1    cyclobenzaprine (FLEXERIL) 10 mg tablet Take 1 Tab by mouth three (3) times daily as needed for Muscle Spasm(s). 30 Tab 1    oxyCODONE IR (OXY-IR) 15 mg immediate release tablet       XTAMPZA ER 9 mg capsule TK ONE C PO  BID  0    amoxicillin (AMOXIL) 500 mg capsule Take 1 Cap by mouth three (3) times daily.  30 Cap 0     Allergies   Allergen Reactions    Other Medication Other (comments)     Pt stated it caused her stomach to hurt when she took the PO dilaudid but has been given it IV with no reaction. Past Medical History:   Diagnosis Date    Chronic pain     low back pain    Constipation     Depression     Fibromyalgia     Hypertension     Osteoarthritis     Other ill-defined conditions(799.89)     chronic kidney infections    Second hand smoke exposure     Sickle cell trait ([address]     pt reports    Spinal stenosis     L5-S1 left moderate to severe     Past Surgical History:   Procedure Laterality Date    HX  SECTION      HX ORTHOPAEDIC      R wrist surgery and foot surgery    HX TUBAL LIGATION       Family History   Problem Relation Age of Onset    Hypertension Mother     Sickle Cell Trait Mother     Diabetes Mother     Stroke Mother         SEVERAL TIAS    Hypertension Father     Heart Attack Father     Heart Disease Father     Hypertension Other         sibling    Anesth Problems Neg Hx      Social History     Tobacco Use    Smoking status: Current Every Day Smoker     Packs/day: 0.30     Years: 30.00     Pack years: 9.00     Types: Cigarettes    Smokeless tobacco: Never Used   Substance Use Topics    Alcohol use: No        Review of Systems    A comprehensive review of systems was negative except for that written in the HPI. Objective:     Visit Vitals  /62   Pulse 75   Temp 97.8 °F (36.6 °C) (Oral)   Resp 16   Ht 5' 7.2\" (1.707 m)   Wt 184 lb (83.5 kg)   BMI 28.65 kg/m²     General:  Alert, cooperative, no distress, appears stated age. Head:  Normocephalic, without obvious abnormality, atraumatic. Eyes:  Conjunctivae/corneas clear. PERRL, EOMs intact. Fundi benign. Ears:  Normal TMs and external ear canals both ears. Nose: Nares normal. Septum midline. Mucosa normal. No drainage or sinus tenderness. Throat: Lips, mucosa, and tongue normal. Teeth and gums normal.   Neck: Supple, symmetrical, trachea midline, cervical adenopathy, thyroid: no enlargement/tenderness/nodules, no carotid bruit and no JVD.    Back: Symmetric, no curvature. ROM normal. No CVA tenderness. Lungs:   Clear to auscultation bilaterally. Chest wall:  No tenderness or deformity. Heart:  Regular rate and rhythm, S1, S2 normal, no murmur, click, rub or gallop. Assessment/Plan:       ICD-10-CM ICD-9-CM    1. Enlarged glands R59.9 785.6 azithromycin (ZITHROMAX) 250 mg tablet      REFERRAL TO ENT-OTOLARYNGOLOGY   2. Sore throat J02.9 462 azithromycin (ZITHROMAX) 250 mg tablet      REFERRAL TO ENT-OTOLARYNGOLOGY          Advised her to call back or return to office if symptoms worsen/change/persist.  Discussed expected course/resolution/complications of diagnosis in detail with patient. Medication risks/benefits/costs/interactions/alternatives discussed with patient. She was given an after visit summary which includes diagnoses, current medications, & vitals. She expressed understanding with the diagnosis and plan.

## 2019-04-12 NOTE — PROGRESS NOTES
Chief Complaint   Patient presents with    Mass     states when she swallows her gland hurts and her voice is changing very haorse. 1. Have you been to the ER, urgent care clinic since your last visit? Hospitalized since your last visit? No    2. Have you seen or consulted any other health care providers outside of the [address] [place] [address] since your last visit? Include any pap smears or colon screening.  No

## 2019-05-20 RX ORDER — AMLODIPINE BESYLATE 10 MG/1
TABLET ORAL
Qty: 90 TAB | Refills: 1 | Status: SHIPPED | OUTPATIENT
Start: 2019-05-20 | End: 2019-11-17 | Stop reason: SDUPTHER

## 2019-06-17 RX ORDER — SERTRALINE HYDROCHLORIDE 50 MG/1
TABLET, FILM COATED ORAL
Qty: 90 TAB | Refills: 1 | Status: SHIPPED | OUTPATIENT
Start: 2019-06-17 | End: 2019-12-29

## 2019-07-20 DIAGNOSIS — M54.9 CHRONIC BACK PAIN, UNSPECIFIED BACK LOCATION, UNSPECIFIED BACK PAIN LATERALITY: Primary | ICD-10-CM

## 2019-07-20 DIAGNOSIS — G89.29 CHRONIC BACK PAIN, UNSPECIFIED BACK LOCATION, UNSPECIFIED BACK PAIN LATERALITY: Primary | ICD-10-CM

## 2019-07-20 RX ORDER — GABAPENTIN 600 MG/1
TABLET ORAL
Qty: 90 TAB | Refills: 6 | Status: SHIPPED | OUTPATIENT
Start: 2019-07-20 | End: 2020-01-29 | Stop reason: SDUPTHER

## 2019-08-28 RX ORDER — LOSARTAN POTASSIUM AND HYDROCHLOROTHIAZIDE 25; 100 MG/1; MG/1
TABLET ORAL
Qty: 90 TAB | Refills: 0 | Status: SHIPPED | OUTPATIENT
Start: 2019-08-28 | End: 2019-11-24 | Stop reason: SDUPTHER

## 2019-11-19 RX ORDER — AMLODIPINE BESYLATE 10 MG/1
TABLET ORAL
Qty: 90 TAB | Refills: 0 | Status: SHIPPED | OUTPATIENT
Start: 2019-11-19 | End: 2020-01-29 | Stop reason: SDUPTHER

## 2019-12-29 RX ORDER — SERTRALINE HYDROCHLORIDE 50 MG/1
TABLET, FILM COATED ORAL
Qty: 90 TAB | Refills: 1 | Status: SHIPPED | OUTPATIENT
Start: 2019-12-29 | End: 2020-01-29 | Stop reason: SDUPTHER

## 2020-01-29 ENCOUNTER — OFFICE VISIT (OUTPATIENT)
Dept: INTERNAL MEDICINE CLINIC | Age: 60
End: 2020-01-29

## 2020-01-29 VITALS
BODY MASS INDEX: 28.56 KG/M2 | RESPIRATION RATE: 16 BRPM | HEART RATE: 76 BPM | DIASTOLIC BLOOD PRESSURE: 85 MMHG | HEIGHT: 67 IN | WEIGHT: 182 LBS | SYSTOLIC BLOOD PRESSURE: 129 MMHG | TEMPERATURE: 97.8 F

## 2020-01-29 DIAGNOSIS — F32.A DEPRESSION, UNSPECIFIED DEPRESSION TYPE: ICD-10-CM

## 2020-01-29 DIAGNOSIS — G89.29 CHRONIC BACK PAIN, UNSPECIFIED BACK LOCATION, UNSPECIFIED BACK PAIN LATERALITY: ICD-10-CM

## 2020-01-29 DIAGNOSIS — I10 ESSENTIAL HYPERTENSION: Primary | ICD-10-CM

## 2020-01-29 DIAGNOSIS — M48.062 SPINAL STENOSIS OF LUMBAR REGION WITH NEUROGENIC CLAUDICATION: ICD-10-CM

## 2020-01-29 DIAGNOSIS — M19.90 ARTHRITIS: ICD-10-CM

## 2020-01-29 DIAGNOSIS — M54.9 CHRONIC BACK PAIN, UNSPECIFIED BACK LOCATION, UNSPECIFIED BACK PAIN LATERALITY: ICD-10-CM

## 2020-01-29 RX ORDER — LOSARTAN POTASSIUM AND HYDROCHLOROTHIAZIDE 25; 100 MG/1; MG/1
TABLET ORAL
Qty: 90 TAB | Refills: 1 | Status: SHIPPED | OUTPATIENT
Start: 2020-01-29 | End: 2020-05-21

## 2020-01-29 RX ORDER — GABAPENTIN 600 MG/1
TABLET ORAL
Qty: 90 TAB | Refills: 3 | Status: SHIPPED | OUTPATIENT
Start: 2020-01-29 | End: 2020-09-25

## 2020-01-29 RX ORDER — SERTRALINE HYDROCHLORIDE 50 MG/1
TABLET, FILM COATED ORAL
Qty: 90 TAB | Refills: 1 | Status: SHIPPED | OUTPATIENT
Start: 2020-01-29

## 2020-01-29 RX ORDER — CYCLOBENZAPRINE HCL 10 MG
10 TABLET ORAL
Qty: 30 TAB | Refills: 1 | Status: SHIPPED | OUTPATIENT
Start: 2020-01-29 | End: 2020-05-21

## 2020-01-29 RX ORDER — AMLODIPINE BESYLATE 10 MG/1
TABLET ORAL
Qty: 90 TAB | Refills: 0 | Status: SHIPPED | OUTPATIENT
Start: 2020-01-29 | End: 2020-05-01

## 2020-01-29 RX ORDER — ALBUTEROL SULFATE 90 UG/1
1 AEROSOL, METERED RESPIRATORY (INHALATION)
Qty: 1 INHALER | Refills: 1 | Status: SHIPPED | OUTPATIENT
Start: 2020-01-29

## 2020-01-29 NOTE — LETTER
NOTIFICATION OF RETURN TO WORK / SCHOOL 
 
1/29/2020 Ms. Niko Rivas [facility] [id number] To Whom It May Concern: 
 
Niko Rivas was under the care of [address] [place] [address] and Primary Care. Mrs. Yaz Doran has multiple medical problems including severe chronic back pain and 
 
 spinal stenosis, generalized chronic pain, neuropathy, hypertension and depression. She sees a specialist for pain management and ambulates with the help of a cane or 
 
 walker. Since the inception of her 's recent and severe medical issues he has 
 
 required full care. Mrs. Yaz Doran has no assistance at home and must take care of him 
 
 fully while she is quite debilitated herself. This situation is detrimental to her health. It is 
 
 critical that this household receive a personal aide to contribute to Mr. Nadiya Germain care. If there are questions or concerns please have the patient contact our office. Sincerely, Jannet Ji MD

## 2020-01-29 NOTE — PROGRESS NOTES
Subjective:      Guillermo Malhotra is a 62 y.o. female who presents today for No chief complaint on file. Patient in today for follow up    She is under a lot of stress because her  had a massive stroke. She is his full time caregiver  She has a lot of difficulty caring for him due to her chronic pain. She says social work is currently evaluating them for a home care aide. Hypertension- compliant with meds, denies side effects  bp well controlled today      Chronic back pain-sees pain management. She sees Dr. Davis Going Zoloft 50 mg 1 tab po daily, tolerating well.       Tobacco abuse -patient still smoking She would like to quit.  However states smoking cigarettes helps her deal with stress.      Obesity has been working on weight loss.       Eczema- uses steroid cream regularly- works well for dryness and scaling of her hands     Flu and pneumovax are UTD             Patient Active Pro           Patient Active Problem List    Diagnosis Date Noted    Spinal stenosis of lumbar region with neurogenic claudication 06/18/2015    Disc herniation 06/18/2015    Chronic back pain 09/17/2012    Joint pain 09/17/2012    Joint stiffness 09/17/2012    HTN (hypertension) 09/17/2012    Arthritis 09/17/2012     Current Outpatient Medications   Medication Sig Dispense Refill    sertraline (ZOLOFT) 50 mg tablet take 1 tablet by mouth once daily 90 Tab 1    losartan-hydroCHLOROthiazide (HYZAAR) 100-25 mg per tablet take 1 tablet by mouth once daily 90 Tab 0    amLODIPine (NORVASC) 10 mg tablet take 1 tablet by mouth once daily 90 Tab 0    gabapentin (NEURONTIN) 600 mg tablet take 1 tablet by mouth three times a day 90 Tab 6    XTAMPZA ER 18 mg ER capsule TK ONE C PO  BID  0    triamcinolone acetonide (KENALOG) 0.025 % topical cream apply to affected area once daily 30 g 2    albuterol (PROVENTIL HFA, VENTOLIN HFA, PROAIR HFA) 90 mcg/actuation inhaler Take 1 Puff by inhalation every four (4) hours as needed for Wheezing. 1 Inhaler 1    cyclobenzaprine (FLEXERIL) 10 mg tablet Take 1 Tab by mouth three (3) times daily as needed for Muscle Spasm(s). 30 Tab 1    oxyCODONE IR (OXY-IR) 15 mg immediate release tablet        Allergies   Allergen Reactions    Other Medication Other (comments)     Pt stated it caused her stomach to hurt when she took the PO dilaudid but has been given it IV with no reaction. Past Medical History:   Diagnosis Date    Chronic pain     low back pain    Constipation     Depression     Fibromyalgia     Hypertension     Osteoarthritis     Other ill-defined conditions(799.89)     chronic kidney infections    Second hand smoke exposure     Sickle cell trait ([facility] [address]     pt reports    Spinal stenosis     L5-S1 left moderate to severe     Past Surgical History:   Procedure Laterality Date    HX  SECTION      HX ORTHOPAEDIC      R wrist surgery and foot surgery    HX TUBAL LIGATION       Family History   Problem Relation Age of Onset    Hypertension Mother     Sickle Cell Trait Mother     Diabetes Mother     Stroke Mother         SEVERAL TIAS    Hypertension Father     Heart Attack Father     Heart Disease Father     Hypertension Other         sibling    Anesth Problems Neg Hx      Social History     Tobacco Use    Smoking status: Current Every Day Smoker     Packs/day: 0.30     Years: 30.00     Pack years: 9.00     Types: Cigarettes    Smokeless tobacco: Never Used   Substance Use Topics    Alcohol use: No        Review of Systems    A comprehensive review of systems was negative except for that written in the HPI. Objective:     Visit Vitals  /85   Pulse 76   Temp 97.8 °F (36.6 °C) (Oral)   Resp 16   Ht 5' 7.2\" (1.707 m)   Wt 182 lb (82.6 kg)   BMI 28.34 kg/m²     General:  Alert, cooperative, crying and emotionally distressed, appears stated age. Head:  Normocephalic, without obvious abnormality, atraumatic.    Eyes: Conjunctivae/corneas clear. PERRL, EOMs intact. Fundi benign. Ears:  Normal TMs and external ear canals both ears. Nose: Nares normal. Septum midline. Mucosa normal. No drainage or sinus tenderness. Throat: Lips, mucosa, and tongue normal. Teeth and gums normal.   Neck: Supple, symmetrical, trachea midline, no adenopathy, thyroid: no enlargement/tenderness/nodules, no carotid bruit and no JVD. Back:   Symmetric, no curvature. limited ROM  Ambulates with device   Lungs:   Clear to auscultation bilaterally. Chest wall:  No tenderness or deformity. Heart:  Regular rate and rhythm, S1, S2 normal, no murmur, click, rub or gallop. Abdomen:   Soft, non-tender. Bowel sounds normal. No masses,  No organomegaly. Extremities: Extremities normal, atraumatic, no cyanosis or edema. Pulses: 2+ and symmetric all extremities. Assessment/Plan:       ICD-10-CM ICD-9-CM    1. Essential hypertension R14 820.6 METABOLIC PANEL, COMPREHENSIVE    Amlodipine  Losartan-hct   2. Chronic back pain, unspecified back location, unspecified back pain laterality M54.9 724.5 gabapentin (NEURONTIN) 600 mg tablet  Refill flexeril- warned about sedation    G89.29 338.29    3. Arthritis M19.90 716.90    4. Spinal stenosis of lumbar region with neurogenic claudication M48.062 724.03    5. Depression- refill sertraline    Wrote letter today delineating patient's medical problems and her inability to be full caregiver for her  due to her medical status       Advised her to call back or return to office if symptoms worsen/change/persist.  Discussed expected course/resolution/complications of diagnosis in detail with patient. Medication risks/benefits/costs/interactions/alternatives discussed with patient. She was given an after visit summary which includes diagnoses, current medications, & vitals. She expressed understanding with the diagnosis and plan.

## 2020-01-29 NOTE — LETTER
NOTIFICATION RETURN TO WORK / SCHOOL 
 
1/29/2020 4:35 PM 
 
Ms. Bob Lara [facility] [address] [id number] To Whom It May Concern: 
 
Bob Lara is currently under the care of Burt Hughes. She has multiple roblems wich include,  back pain, spinal stenosis, chronic pain, Hypertension and depression, she uses a walker and cane to ambulate. Since inception of her husbands illness she has provided full time care for him. With no assistance. This is detrimental to her own health it is critical that she be provided with a personal aid to assist with his daily care as soon as possible. If there are questions or concerns please have the patient contact our office. Sincerely, Cody Navarro MD

## 2020-01-31 LAB
ALBUMIN SERPL-MCNC: 4.3 G/DL (ref 3.8–4.8)
ALBUMIN/GLOB SERPL: 1.3 {RATIO} (ref 1.2–2.2)
ALP SERPL-CCNC: 125 IU/L (ref 39–117)
ALT SERPL-CCNC: 9 IU/L (ref 0–32)
AST SERPL-CCNC: 20 IU/L (ref 0–40)
BILIRUB SERPL-MCNC: 0.3 MG/DL (ref 0–1.2)
BUN SERPL-MCNC: 12 MG/DL (ref 8–27)
BUN/CREAT SERPL: 14 (ref 12–28)
CALCIUM SERPL-MCNC: 9.5 MG/DL (ref 8.7–10.3)
CHLORIDE SERPL-SCNC: 99 MMOL/L (ref 96–106)
CO2 SERPL-SCNC: 25 MMOL/L (ref 20–29)
CREAT SERPL-MCNC: 0.85 MG/DL (ref 0.57–1)
GLOBULIN SER CALC-MCNC: 3.3 G/DL (ref 1.5–4.5)
GLUCOSE SERPL-MCNC: 124 MG/DL (ref 65–99)
POTASSIUM SERPL-SCNC: 3.2 MMOL/L (ref 3.5–5.2)
PROT SERPL-MCNC: 7.6 G/DL (ref 6–8.5)
SODIUM SERPL-SCNC: 139 MMOL/L (ref 134–144)

## 2020-04-03 ENCOUNTER — VIRTUAL VISIT (OUTPATIENT)
Dept: INTERNAL MEDICINE CLINIC | Age: 60
End: 2020-04-03

## 2020-04-03 DIAGNOSIS — F43.21 GRIEF REACTION: Primary | ICD-10-CM

## 2020-04-03 DIAGNOSIS — E87.6 HYPOKALEMIA: ICD-10-CM

## 2020-04-03 RX ORDER — POTASSIUM CHLORIDE 750 MG/1
10 TABLET, EXTENDED RELEASE ORAL DAILY
Qty: 5 TAB | Refills: 0 | Status: SHIPPED | OUTPATIENT
Start: 2020-04-03

## 2020-04-03 RX ORDER — SERTRALINE HYDROCHLORIDE 100 MG/1
100 TABLET, FILM COATED ORAL DAILY
Qty: 30 TAB | Refills: 5 | Status: SHIPPED | OUTPATIENT
Start: 2020-04-03

## 2020-04-03 NOTE — PROGRESS NOTES
Subjective:      Pasha Downing is a 62 y.o. female who presents today for No chief complaint on file. Pasha Downing is a 62 y.o. female evaluated via telephone on 4/3/2020. Consent:  She and/or health care decision maker is aware that that she may receive a bill for this telephone service, depending on her insurance coverage, and has provided verbal consent to proceed: Yes      Documentation:  I communicated with the patient and/or health care decision maker about grief reaction and lab results, specifically hypokalemia   Details of this discussion including any medical advice provided:     Going through grief process lost  of 30 years on 3/5/20. She has been taking sertraline 50 mg daily and feels that this is helping. Family members are checking on her. She is not suicidal or homicidal but feels she is very depressed. Appetite and sleep have been poor. I will increase her sertraline to 100 mg po daily  Start therapy    Hypokalemia-   Prescribe prescription potassium for 5 days  Recheck BMP            I affirm this is a Patient Initiated Episode with an Established Patient who has not had a related appointment within my department in the past 7 days or scheduled within the next 24 hours.     Total Time: minutes: 21-30 minutes    Note: not billable if this call serves to triage the patient into an appointment for the relevant concern      Efren Nichols MD   Phone Visit only            Patient Active Problem List    Diagnosis Date Noted    Spinal stenosis of lumbar region with neurogenic claudication 06/18/2015    Disc herniation 06/18/2015    Chronic back pain 09/17/2012    Joint pain 09/17/2012    Joint stiffness 09/17/2012    HTN (hypertension) 09/17/2012    Arthritis 09/17/2012     Current Outpatient Medications   Medication Sig Dispense Refill    losartan-hydroCHLOROthiazide (HYZAAR) 100-25 mg per tablet 1 tab po daily 90 Tab 1    sertraline (ZOLOFT) 50 mg tablet 1 tab po daily 90 Tab 1    amLODIPine (NORVASC) 10 mg tablet 1 tab po daily 90 Tab 0    gabapentin (NEURONTIN) 600 mg tablet take 1 tablet by mouth three times a day 90 Tab 3    albuterol (PROVENTIL HFA, VENTOLIN HFA, PROAIR HFA) 90 mcg/actuation inhaler Take 1 Puff by inhalation every four (4) hours as needed for Wheezing. 1 Inhaler 1    cyclobenzaprine (FLEXERIL) 10 mg tablet Take 1 Tab by mouth three (3) times daily as needed for Muscle Spasm(s). 30 Tab 1    XTAMPZA ER 18 mg ER capsule TK ONE C PO  BID  0    triamcinolone acetonide (KENALOG) 0.025 % topical cream apply to affected area once daily 30 g 2    oxyCODONE IR (OXY-IR) 15 mg immediate release tablet        Allergies   Allergen Reactions    Other Medication Other (comments)     Pt stated it caused her stomach to hurt when she took the PO dilaudid but has been given it IV with no reaction. Past Medical History:   Diagnosis Date    Chronic pain     low back pain    Constipation     Depression     Fibromyalgia     Hypertension     Osteoarthritis     Other ill-defined conditions(649.89)     chronic kidney infections    Second hand smoke exposure     Sickle cell trait ([facility] [address]     pt reports    Spinal stenosis     L5-S1 left moderate to severe     Past Surgical History:   Procedure Laterality Date    HX  SECTION      HX ORTHOPAEDIC      R wrist surgery and foot surgery    HX TUBAL LIGATION       Family History   Problem Relation Age of Onset    Hypertension Mother     Sickle Cell Trait Mother     Diabetes Mother     Stroke Mother         SEVERAL TIAS    Hypertension Father     Heart Attack Father     Heart Disease Father     Hypertension Other         sibling    Anesth Problems Neg Hx      Social History     Tobacco Use    Smoking status: Current Every Day Smoker     Packs/day: 0.30     Years: 30.00     Pack years: 9.00     Types: Cigarettes    Smokeless tobacco: Never Used   Substance Use Topics    Alcohol use:  No

## 2020-04-13 ENCOUNTER — TELEPHONE (OUTPATIENT)
Dept: PEDIATRICS CLINIC | Age: 60
End: 2020-04-13

## 2020-05-01 RX ORDER — AMLODIPINE BESYLATE 10 MG/1
TABLET ORAL
Qty: 90 TAB | Refills: 0 | Status: SHIPPED | OUTPATIENT
Start: 2020-05-01 | End: 2020-10-14

## 2020-05-21 RX ORDER — LOSARTAN POTASSIUM AND HYDROCHLOROTHIAZIDE 25; 100 MG/1; MG/1
TABLET ORAL
Qty: 90 TAB | Refills: 1 | Status: SHIPPED | OUTPATIENT
Start: 2020-05-21 | End: 2020-09-21 | Stop reason: SDUPTHER

## 2020-05-21 RX ORDER — CYCLOBENZAPRINE HCL 10 MG
TABLET ORAL
Qty: 30 TAB | Refills: 1 | Status: SHIPPED | OUTPATIENT
Start: 2020-05-21

## 2020-09-21 DIAGNOSIS — M54.9 CHRONIC BACK PAIN, UNSPECIFIED BACK LOCATION, UNSPECIFIED BACK PAIN LATERALITY: ICD-10-CM

## 2020-09-21 DIAGNOSIS — G89.29 CHRONIC BACK PAIN, UNSPECIFIED BACK LOCATION, UNSPECIFIED BACK PAIN LATERALITY: ICD-10-CM

## 2020-09-22 RX ORDER — LOSARTAN POTASSIUM AND HYDROCHLOROTHIAZIDE 25; 100 MG/1; MG/1
TABLET ORAL
Qty: 90 TAB | Refills: 1 | Status: SHIPPED | OUTPATIENT
Start: 2020-09-22 | End: 2020-12-15 | Stop reason: SDUPTHER

## 2020-09-25 RX ORDER — GABAPENTIN 600 MG/1
TABLET ORAL
Qty: 90 TAB | Refills: 0 | Status: SHIPPED | OUTPATIENT
Start: 2020-09-25 | End: 2020-12-15 | Stop reason: SDUPTHER

## 2020-10-14 RX ORDER — AMLODIPINE BESYLATE 10 MG/1
TABLET ORAL
Qty: 90 TAB | Refills: 0 | Status: SHIPPED | OUTPATIENT
Start: 2020-10-14 | End: 2020-12-15 | Stop reason: SDUPTHER

## 2020-11-06 ENCOUNTER — OFFICE VISIT (OUTPATIENT)
Dept: INTERNAL MEDICINE CLINIC | Age: 60
End: 2020-11-06
Payer: MEDICARE

## 2020-11-06 VITALS
DIASTOLIC BLOOD PRESSURE: 75 MMHG | HEIGHT: 66 IN | HEART RATE: 75 BPM | SYSTOLIC BLOOD PRESSURE: 111 MMHG | BODY MASS INDEX: 28.69 KG/M2 | RESPIRATION RATE: 18 BRPM | TEMPERATURE: 98.9 F | WEIGHT: 178.5 LBS | OXYGEN SATURATION: 97 %

## 2020-11-06 DIAGNOSIS — N39.0 FREQUENT UTI: ICD-10-CM

## 2020-11-06 DIAGNOSIS — Z12.4 PAP SMEAR FOR CERVICAL CANCER SCREENING: ICD-10-CM

## 2020-11-06 DIAGNOSIS — Z23 ENCOUNTER FOR IMMUNIZATION: Primary | ICD-10-CM

## 2020-11-06 DIAGNOSIS — F43.21 GRIEF: ICD-10-CM

## 2020-11-06 DIAGNOSIS — E87.6 HYPOKALEMIA: ICD-10-CM

## 2020-11-06 DIAGNOSIS — I10 ESSENTIAL HYPERTENSION: ICD-10-CM

## 2020-11-06 DIAGNOSIS — Z78.0 POSTMENOPAUSAL: ICD-10-CM

## 2020-11-06 DIAGNOSIS — Z12.11 COLON CANCER SCREENING: ICD-10-CM

## 2020-11-06 DIAGNOSIS — Z13.31 SCREENING FOR DEPRESSION: ICD-10-CM

## 2020-11-06 DIAGNOSIS — Z00.00 MEDICARE ANNUAL WELLNESS VISIT, SUBSEQUENT: ICD-10-CM

## 2020-11-06 PROCEDURE — G8752 SYS BP LESS 140: HCPCS | Performed by: INTERNAL MEDICINE

## 2020-11-06 PROCEDURE — 3017F COLORECTAL CA SCREEN DOC REV: CPT | Performed by: INTERNAL MEDICINE

## 2020-11-06 PROCEDURE — G0439 PPPS, SUBSEQ VISIT: HCPCS | Performed by: INTERNAL MEDICINE

## 2020-11-06 PROCEDURE — G8432 DEP SCR NOT DOC, RNG: HCPCS | Performed by: INTERNAL MEDICINE

## 2020-11-06 PROCEDURE — G8427 DOCREV CUR MEDS BY ELIG CLIN: HCPCS | Performed by: INTERNAL MEDICINE

## 2020-11-06 PROCEDURE — 90471 IMMUNIZATION ADMIN: CPT | Performed by: INTERNAL MEDICINE

## 2020-11-06 PROCEDURE — 90715 TDAP VACCINE 7 YRS/> IM: CPT | Performed by: INTERNAL MEDICINE

## 2020-11-06 PROCEDURE — G8419 CALC BMI OUT NRM PARAM NOF/U: HCPCS | Performed by: INTERNAL MEDICINE

## 2020-11-06 PROCEDURE — 99214 OFFICE O/P EST MOD 30 MIN: CPT | Performed by: INTERNAL MEDICINE

## 2020-11-06 PROCEDURE — G8754 DIAS BP LESS 90: HCPCS | Performed by: INTERNAL MEDICINE

## 2020-11-06 RX ORDER — CEPHALEXIN 500 MG/1
CAPSULE ORAL
COMMUNITY
Start: 2020-11-01

## 2020-11-06 RX ORDER — DOCUSATE SODIUM 100 MG/1
100 CAPSULE, LIQUID FILLED ORAL 2 TIMES DAILY
Qty: 60 CAP | Refills: 2 | Status: SHIPPED | OUTPATIENT
Start: 2020-11-06 | End: 2021-02-04

## 2020-11-06 NOTE — PATIENT INSTRUCTIONS
Medicare Wellness Visit, Female The best way to live healthy is to have a lifestyle where you eat a well-balanced diet, exercise regularly, limit alcohol use, and quit all forms of tobacco/nicotine, if applicable. Regular preventive services are another way to keep healthy. Preventive services (vaccines, screening tests, monitoring & exams) can help personalize your care plan, which helps you manage your own care. Screening tests can find health problems at the earliest stages, when they are easiest to treat. [address] follows the current, evidence-based guidelines published by the [facility] Silvino Cori ([facility]) when recommending preventive services for our patients. Because we follow these guidelines, sometimes recommendations change over time as research supports it. (For example, mammograms used to be recommended annually. Even though Medicare will still pay for an annual mammogram, the newer guidelines recommend a mammogram every two years for women of average risk.) Of course, you and your doctor may decide to screen more often for some diseases, based on your risk and your health status. Preventive services for you include: - Medicare offers their members a free annual wellness visit, which is time for you and your primary care provider to discuss and plan for your preventive service needs. Take advantage of this benefit every year! 
-All adults over the age of 72 should receive the recommended pneumonia vaccines. Current USPSTF guidelines recommend a series of two vaccines for the best pneumonia protection.  
-All adults should have a flu vaccine yearly and a tetanus vaccine every 10 years. All adults age 48 and older should receive a shingles vaccine once in their lifetime.   
-A bone mass density test is recommended when a woman turns 65 to screen for osteoporosis. This test is only recommended one time, as a screening. Some providers will use this same test as a disease monitoring tool if you already have osteoporosis. -All adults age 38-68 who are overweight should have a diabetes screening test once every three years.  
-Other screening tests and preventive services for persons with diabetes include: an eye exam to screen for diabetic retinopathy, a kidney function test, a foot exam, and stricter control over your cholesterol.  
-Cardiovascular screening for adults with routine risk involves an electrocardiogram (ECG) at intervals determined by your doctor.  
-Colorectal cancer screenings should be done for adults age 54-65 with no increased risk factors for colorectal cancer. There are a number of acceptable methods of screening for this type of cancer. Each test has its own benefits and drawbacks. Discuss with your doctor what is most appropriate for you during your annual wellness visit. The different tests include: colonoscopy (considered the best screening method), a fecal occult blood test, a fecal DNA test, and sigmoidoscopy. -Breast cancer screenings are recommended every other year for women of normal risk, age 54-69. 
-Cervical cancer screenings for women over age 72 are only recommended with certain risk factors.  
-All adults born between [facility] should be screened once for Hepatitis C. Here is a list of your current Health Maintenance items (your personalized list of preventive services) with a due date: 
Health Maintenance Due Topic Date Due  
 DTaP/Tdap/Td  (1 - Tdap) 08/22/1979  Pap Test  08/22/1979  Colorectal Screening  08/22/2008  Mammogram  08/22/2008 [facility] Annual Well Visit  03/07/2019  Cholesterol Test   06/03/2020 Medicare Wellness Visit, Female The best way to live healthy is to have a lifestyle where you eat a well-balanced diet, exercise regularly, limit alcohol use, and quit all forms of tobacco/nicotine, if applicable. Regular preventive services are another way to keep healthy. Preventive services (vaccines, screening tests, monitoring & exams) can help personalize your care plan, which helps you manage your own care. Screening tests can find health problems at the earliest stages, when they are easiest to treat. [facility] follows the current, evidence-based guidelines published by the [facility] Silvino Persaud ([facility]) when recommending preventive services for our patients. Because we follow these guidelines, sometimes recommendations change over time as research supports it. (For example, mammograms used to be recommended annually. Even though Medicare will still pay for an annual mammogram, the newer guidelines recommend a mammogram every two years for women of average risk). Of course, you and your doctor may decide to screen more often for some diseases, based on your risk and your co-morbidities (chronic disease you are already diagnosed with). Preventive services for you include: - Medicare offers their members a free annual wellness visit, which is time for you and your primary care provider to discuss and plan for your preventive service needs. Take advantage of this benefit every year! 
-All adults over the age of 72 should receive the recommended pneumonia vaccines. Current USPSTF guidelines recommend a series of two vaccines for the best pneumonia protection.  
-All adults should have a flu vaccine yearly and a tetanus vaccine every 10 years.  
-All adults age 48 and older should receive the shingles vaccines (series of two vaccines).      
-All adults age 38-68 who are overweight should have a diabetes screening test once every three years.  
-All adults born between [birth year] and 1967 should be screened once for Hepatitis C. 
-Other screening tests and preventive services for persons with diabetes include: an eye exam to screen for diabetic retinopathy, a kidney function test, a foot exam, and stricter control over your cholesterol.  
-Cardiovascular screening for adults with routine risk involves an electrocardiogram (ECG) at intervals determined by your doctor.  
-Colorectal cancer screenings should be done for adults age 54-65 with no increased risk factors for colorectal cancer. There are a number of acceptable methods of screening for this type of cancer. Each test has its own benefits and drawbacks. Discuss with your doctor what is most appropriate for you during your annual wellness visit. The different tests include: colonoscopy (considered the best screening method), a fecal occult blood test, a fecal DNA test, and sigmoidoscopy. 
 
-A bone mass density test is recommended when a woman turns 65 to screen for osteoporosis. This test is only recommended one time, as a screening. Some providers will use this same test as a disease monitoring tool if you already have osteoporosis. -Breast cancer screenings are recommended every other year for women of normal risk, age 54-69. 
-Cervical cancer screenings for women over age 72 are only recommended with certain risk factors. Here is a list of your current Health Maintenance items (your personalized list of preventive services) with a due date: 
Health Maintenance Due Topic Date Due  
 DTaP/Tdap/Td  (1 - Tdap) 08/22/1979  Pap Test  08/22/1979  Colorectal Screening  08/22/2008  Mammogram  08/22/2008 [facility] Annual Well Visit  03/07/2019  Cholesterol Test   06/03/2020 Vaccine Information Statement Tdap (Tetanus, Diphtheria, Pertussis) Vaccine: What you need to know Many Vaccine Information Statements are available in [place] and other languages. See www.immunize.org/vis Hojas de información sobre vacunas están disponibles en español y en muchos otros idiomas. Visite www.immunize.org/vis 1. Why get vaccinated? Tdap vaccine can prevent tetanus, diphtheria, and pertussis. Diphtheria and pertussis spread from person to person. Tetanus enters the body through cuts or wounds.  TETANUS (T) causes painful stiffening of the muscles. Tetanus can lead to serious health problems, including being unable to open the mouth, having trouble swallowing and breathing, or death.  DIPHTHERIA (D) can lead to difficulty breathing, heart failure, paralysis, or death.  PERTUSSIS (aP), also known as whooping cough, can cause uncontrollable, violent coughing which makes it hard to breathe, eat, or drink. Pertussis can be extremely serious in babies and young children, causing pneumonia, convulsions, brain damage, or death. In teens and adults, it can cause weight loss, loss of bladder control, passing out, and rib fractures from severe coughing. 2. Tdap vaccine Tdap is only for children 7 years and older, adolescents, and adults. Adolescents should receive a single dose of Tdap, preferably at age 6 or 15 years. Pregnant women should get a dose of Tdap during every pregnancy, to protect the  from pertussis. Infants are most at risk for severe, life-threatening complications from pertussis. Adults who have never received Tdap should get a dose of Tdap. Also, adults should receive a booster dose every 10 years, or earlier in the case of a severe and dirty wound or burn. Booster doses can be either Tdap or Td (a different vaccine that protects against tetanus and diphtheria but not pertussis). Tdap may be given at the same time as other vaccines. 3. Talk with your health care provider Tell your vaccine provider if the person getting the vaccine: 
 Has had an allergic reaction after a previous dose of any vaccine that protects against tetanus, diphtheria, or pertussis, or has any severe, life-threatening allergies.   
 Has had a coma, decreased level of consciousness, or prolonged seizures within 7 days after a previous dose of any pertussis vaccine (DTP, DTaP, or Tdap).  Has seizures or another nervous system problem.  Has ever had Guillain-Barré Syndrome (also called GBS).  Has had severe pain or swelling after a previous dose of any vaccine that protects against tetanus or diphtheria. In some cases, your health care provider may decide to postpone Tdap vaccination to a future visit. People with minor illnesses, such as a cold, may be vaccinated. People who are moderately or severely ill should usually wait until they recover before getting Tdap vaccine. Your health care provider can give you more information. 4. Risks of a vaccine reaction  Pain, redness, or swelling where the shot was given, mild fever, headache, feeling tired, and nausea, vomiting, diarrhea, or stomachache sometimes happen after Tdap vaccine. People sometimes faint after medical procedures, including vaccination. Tell your provider if you feel dizzy or have vision changes or ringing in the ears. As with any medicine, there is a very remote chance of a vaccine causing a severe allergic reaction, other serious injury, or death. 5. What if there is a serious problem? An allergic reaction could occur after the vaccinated person leaves the clinic. If you see signs of a severe allergic reaction (hives, swelling of the face and throat, difficulty breathing, a fast heartbeat, dizziness, or weakness), call 9-1-1 and get the person to the nearest hospital. 
 
For other signs that concern you, call your health care provider. Adverse reactions should be reported to the Vaccine Adverse Event Reporting System (VAERS). Your health care provider will usually file this report, or you can do it yourself. Visit the VAERS website at www.vaers. hhs.gov or call 7-584.263.3196. VAERS is only for reporting reactions, and VAERS staff do not give medical advice. 6. The National Vaccine Injury Compensation Program 
 
The [facility] Vaccine Injury Compensation Program (VICP) is a federal program that was created to compensate people who may have been injured by certain vaccines. Visit the VICP website at www.hrsa.gov/vaccinecompensation or call 9-868.251.4937 to learn about the program and about filing a claim. There is a time limit to file a claim for compensation. 7. How can I learn more?  Ask your health care provider.  Call your local or state health department.  Contact the Centers for Disease Control and Prevention (CDC): 
- Call 0-982.294.5049 (1-800-CDC-INFO) or 
- Visit CDCs website at www.cdc.gov/vaccines Vaccine Information Statement (Interim) Tdap (Tetanus, Diphtheria, Pertussis) Vaccine 04/01/2020 
[address] 414IS-[address] [facility] [employer] Centers for Disease Control and Prevention Office Use Only Medicare Wellness Visit, Female The best way to live healthy is to have a lifestyle where you eat a well-balanced diet, exercise regularly, limit alcohol use, and quit all forms of tobacco/nicotine, if applicable. Regular preventive services are another way to keep healthy. Preventive services (vaccines, screening tests, monitoring & exams) can help personalize your care plan, which helps you manage your own care. Screening tests can find health problems at the earliest stages, when they are easiest to treat. [address] follows the current, evidence-based guidelines published by the [facility] Silvino Cori (USPSTF) when recommending preventive services for our patients. Because we follow these guidelines, sometimes recommendations change over time as research supports it. (For example, mammograms used to be recommended annually. Even though Medicare will still pay for an annual mammogram, the newer guidelines recommend a mammogram every two years for women of average risk.) Of course, you and your doctor may decide to screen more often for some diseases, based on your risk and your health status. Preventive services for you include: - Medicare offers their members a free annual wellness visit, which is time for you and your primary care provider to discuss and plan for your preventive service needs. Take advantage of this benefit every year! 
-All adults over the age of 72 should receive the recommended pneumonia vaccines. Current USPSTF guidelines recommend a series of two vaccines for the best pneumonia protection.  
-All adults should have a flu vaccine yearly and a tetanus vaccine every 10 years. All adults age 48 and older should receive a shingles vaccine once in their lifetime.   
-A bone mass density test is recommended when a woman turns 65 to screen for osteoporosis. This test is only recommended one time, as a screening. Some providers will use this same test as a disease monitoring tool if you already have osteoporosis. -All adults age 38-68 who are overweight should have a diabetes screening test once every three years.  
-Other screening tests and preventive services for persons with diabetes include: an eye exam to screen for diabetic retinopathy, a kidney function test, a foot exam, and stricter control over your cholesterol.  
-Cardiovascular screening for adults with routine risk involves an electrocardiogram (ECG) at intervals determined by your doctor.  
-Colorectal cancer screenings should be done for adults age 54-65 with no increased risk factors for colorectal cancer. There are a number of acceptable methods of screening for this type of cancer. Each test has its own benefits and drawbacks. Discuss with your doctor what is most appropriate for you during your annual wellness visit. The different tests include: colonoscopy (considered the best screening method), a fecal occult blood test, a fecal DNA test, and sigmoidoscopy. -Breast cancer screenings are recommended every other year for women of normal risk, age 54-69. 
-Cervical cancer screenings for women over age 72 are only recommended with certain risk factors.  
-All adults born between [facility] should be screened once for Hepatitis C. Here is a list of your current Health Maintenance items (your personalized list of preventive services) with a due date: 
Health Maintenance Due Topic Date Due  
 Pap Test  08/22/1979  Colorectal Screening  08/22/2008  Mammogram  08/22/2008 Medicare Wellness Visit, Female The best way to live healthy is to have a lifestyle where you eat a well-balanced diet, exercise regularly, limit alcohol use, and quit all forms of tobacco/nicotine, if applicable. Regular preventive services are another way to keep healthy. Preventive services (vaccines, screening tests, monitoring & exams) can help personalize your care plan, which helps you manage your own care. Screening tests can find health problems at the earliest stages, when they are easiest to treat. [facility] follows the current, evidence-based guidelines published by the [facility] Silvino Persaud (USPSTF) when recommending preventive services for our patients. Because we follow these guidelines, sometimes recommendations change over time as research supports it. (For example, mammograms used to be recommended annually. Even though Medicare will still pay for an annual mammogram, the newer guidelines recommend a mammogram every two years for women of average risk). Of course, you and your doctor may decide to screen more often for some diseases, based on your risk and your co-morbidities (chronic disease you are already diagnosed with). Preventive services for you include: - Medicare offers their members a free annual wellness visit, which is time for you and your primary care provider to discuss and plan for your preventive service needs. Take advantage of this benefit every year! 
-All adults over the age of 72 should receive the recommended pneumonia vaccines. Current USPSTF guidelines recommend a series of two vaccines for the best pneumonia protection.  
-All adults should have a flu vaccine yearly and a tetanus vaccine every 10 years.  
-All adults age 48 and older should receive the shingles vaccines (series of two vaccines). -All adults age 38-68 who are overweight should have a diabetes screening test once every three years.  
-All adults born between [birth year] and 1967 should be screened once for Hepatitis C. 
-Other screening tests and preventive services for persons with diabetes include: an eye exam to screen for diabetic retinopathy, a kidney function test, a foot exam, and stricter control over your cholesterol.  
-Cardiovascular screening for adults with routine risk involves an electrocardiogram (ECG) at intervals determined by your doctor.  
-Colorectal cancer screenings should be done for adults age 54-65 with no increased risk factors for colorectal cancer. There are a number of acceptable methods of screening for this type of cancer. Each test has its own benefits and drawbacks. Discuss with your doctor what is most appropriate for you during your annual wellness visit. The different tests include: colonoscopy (considered the best screening method), a fecal occult blood test, a fecal DNA test, and sigmoidoscopy. 
 
-A bone mass density test is recommended when a woman turns 65 to screen for osteoporosis. This test is only recommended one time, as a screening. Some providers will use this same test as a disease monitoring tool if you already have osteoporosis. -Breast cancer screenings are recommended every other year for women of normal risk, age 54-69. 
-Cervical cancer screenings for women over age 72 are only recommended with certain risk factors. Here is a list of your current Health Maintenance items (your personalized list of preventive services) with a due date: 
Health Maintenance Due Topic Date Due  
 Pap Test  08/22/1979  Colorectal Screening  08/22/2008  Mammogram  08/22/2008

## 2020-11-06 NOTE — PROGRESS NOTES
1. Have you been to the ER, urgent care clinic since your last visit? Hospitalized since your last visit? Yes When: 2020 Reason for visit: abdominal pain    2. Have you seen or consulted any other health care providers outside of the [address] [place] [address] since your last visit? Include any pap smears or colon screening. Yes Where: [facility]    Wants to discuss hot flashes  This is the Subsequent Medicare Annual Wellness Exam, performed 12 months or more after the Initial AWV or the last Subsequent AWV    I have reviewed the patient's medical history in detail and updated the computerized patient record.      History     Patient Active Problem List   Diagnosis Code    Chronic back pain M54.9, G89.29    Joint pain M25.50    Joint stiffness M25.60    HTN (hypertension) I10    Arthritis M19.90    Spinal stenosis of lumbar region with neurogenic claudication M48.062    Disc herniation [id number]     Past Medical History:   Diagnosis Date    Chronic pain     low back pain    Constipation     Depression     Fibromyalgia     Hypertension     Osteoarthritis     Other ill-defined conditions(799.89)     chronic kidney infections    Second hand smoke exposure     Sickle cell trait ([facility] [address]     pt reports    Spinal stenosis     L5-S1 left moderate to severe      Past Surgical History:   Procedure Laterality Date    HX  SECTION      HX ORTHOPAEDIC      R wrist surgery and foot surgery    HX TUBAL LIGATION       Current Outpatient Medications   Medication Sig Dispense Refill    cephALEXin (KEFLEX) 500 mg capsule TK 1 C PO BID      amLODIPine (NORVASC) 10 mg tablet TAKE 1 tablet by mouth once daily 90 Tab 0    gabapentin (NEURONTIN) 600 mg tablet take 1 tablet by mouth three times a day 90 Tab 0    losartan-hydroCHLOROthiazide (HYZAAR) 100-25 mg per tablet take 1 tablet by mouth once daily 90 Tab 1    cyclobenzaprine (FLEXERIL) 10 mg tablet take 1 tablet by mouth three times a day if needed for muscle spasm 30 Tab 1    sertraline (ZOLOFT) 100 mg tablet Take 1 Tab by mouth daily. 30 Tab 5    potassium chloride (KLOR-CON) 10 mEq tablet Take 1 Tab by mouth daily. 5 Tab 0    sertraline (ZOLOFT) 50 mg tablet 1 tab po daily 90 Tab 1    albuterol (PROVENTIL HFA, VENTOLIN HFA, PROAIR HFA) 90 mcg/actuation inhaler Take 1 Puff by inhalation every four (4) hours as needed for Wheezing. 1 Inhaler 1    XTAMPZA ER 18 mg ER capsule TK ONE C PO  BID  0    triamcinolone acetonide (KENALOG) 0.025 % topical cream apply to affected area once daily 30 g 2    oxyCODONE IR (OXY-IR) 15 mg immediate release tablet        Allergies   Allergen Reactions    Other Medication Other (comments)     Pt stated it caused her stomach to hurt when she took the PO dilaudid but has been given it IV with no reaction. Family History   Problem Relation Age of Onset    Hypertension Mother     Sickle Cell Trait Mother     Diabetes Mother     Stroke Mother         SEVERAL TIAS    Hypertension Father     Heart Attack Father     Heart Disease Father     Hypertension Other         sibling    Anesth Problems Neg Hx      Social History     Tobacco Use    Smoking status: Current Every Day Smoker     Packs/day: 0.30     Years: 30.00     Pack years: 9.00     Types: Cigarettes    Smokeless tobacco: Never Used   Substance Use Topics    Alcohol use: No       Depression Risk Factor Screening:     3 most recent PHQ Screens 11/6/2020   Little interest or pleasure in doing things More than half the days   Feeling down, depressed, irritable, or hopeless More than half the days   Total Score PHQ 2 4       Alcohol Risk Screen   Do you average more than 1 drink per night or more than 7 drinks a week:  No    On any one occasion in the past three months have you have had more than 3 drinks containing alcohol:  No        Functional Ability and Level of Safety:   Hearing: Hearing is good. Activities of Daily Living:   The home contains: handrails and grab bars  Patient does total self care     Ambulation: with difficulty, uses a walker     Fall Risk:  Fall Risk Assessment, last 12 mths 2/5/2018   Able to walk? Yes   Fall in past 12 months? Yes   Fall with injury?  No   Number of falls in past 12 months 1   Fall Risk Score 1     Abuse Screen:  Patient is not abused       Cognitive Screening   Has your family/caregiver stated any concerns about your memory: no     Cognitive Screening: not necessary    Patient Care Team   Patient Care Team:  Shagufta Velasco MD as PCP - General (Internal Medicine)  Shagufta Velasco MD as PCP - [facility] Empaneled Provider    Assessment/Plan   Education and counseling provided:  Are appropriate based on today's review and evaluation        Health Maintenance Due   Topic Date Due    DTaP/Tdap/Td series (1 - Tdap) 08/22/1979    PAP AKA CERVICAL CYTOLOGY  08/22/1979    Colorectal Cancer Screening Combo  08/22/2008    Breast Cancer Screen Mammogram  08/22/2008    Medicare Yearly Exam  03/07/2019    Lipid Screen  06/03/2020

## 2020-11-06 NOTE — PROGRESS NOTES
Subjective:      Parmjit Rojas is a 56 y.o. female who presents today for   Chief Complaint   Patient presents with    Annual Wellness Visit     Patient having depression and grief due to loss of . She is taking sertraline 100 mg daily  She cries easily and frequently. She has a depressed mood and says isolation from quarantine is making things worse. She is not suicidal or homicidal.      Has frequent UTI  She was seen at [facility] for abdominal pain last sunday  Was recently seen in ER given antibiotic keflex  For UTI  She also says she was prescribed  potassium    Chronic pain- takes narcotic and gabapentin sees pain management    Mammogram- need to order  Pap need to refer for pap  FIT   Refuses colonoscopy  Dexa need to order    Shingles UTD  Flu  UTD  tdap  today  Pneumovax provided   RX to get at pharmacy  Patient Active Problem List    Diagnosis Date Noted    Spinal stenosis of lumbar region with neurogenic claudication 06/18/2015    Disc herniation 06/18/2015    Chronic back pain 09/17/2012    Joint pain 09/17/2012    Joint stiffness 09/17/2012    HTN (hypertension) 09/17/2012    Arthritis 09/17/2012     Current Outpatient Medications   Medication Sig Dispense Refill    cephALEXin (KEFLEX) 500 mg capsule TK 1 C PO BID      amLODIPine (NORVASC) 10 mg tablet TAKE 1 tablet by mouth once daily 90 Tab 0    gabapentin (NEURONTIN) 600 mg tablet take 1 tablet by mouth three times a day 90 Tab 0    losartan-hydroCHLOROthiazide (HYZAAR) 100-25 mg per tablet take 1 tablet by mouth once daily 90 Tab 1    cyclobenzaprine (FLEXERIL) 10 mg tablet take 1 tablet by mouth three times a day if needed for muscle spasm 30 Tab 1    sertraline (ZOLOFT) 100 mg tablet Take 1 Tab by mouth daily. 30 Tab 5    potassium chloride (KLOR-CON) 10 mEq tablet Take 1 Tab by mouth daily.  5 Tab 0    sertraline (ZOLOFT) 50 mg tablet 1 tab po daily 90 Tab 1    albuterol (PROVENTIL HFA, VENTOLIN HFA, PROAIR HFA) 90 mcg/actuation inhaler Take 1 Puff by inhalation every four (4) hours as needed for Wheezing. 1 Inhaler 1    XTAMPZA ER 18 mg ER capsule TK ONE C PO  BID  0    triamcinolone acetonide (KENALOG) 0.025 % topical cream apply to affected area once daily 30 g 2    oxyCODONE IR (OXY-IR) 15 mg immediate release tablet        Allergies   Allergen Reactions    Other Medication Other (comments)     Pt stated it caused her stomach to hurt when she took the PO dilaudid but has been given it IV with no reaction. Past Medical History:   Diagnosis Date    Chronic pain     low back pain    Constipation     Depression     Fibromyalgia     Hypertension     Osteoarthritis     Other ill-defined conditions(799.89)     chronic kidney infections    Second hand smoke exposure     Sickle cell trait ([facility] [address]     pt reports    Spinal stenosis     L5-S1 left moderate to severe     Past Surgical History:   Procedure Laterality Date    HX  SECTION      HX ORTHOPAEDIC      R wrist surgery and foot surgery    HX TUBAL LIGATION       Family History   Problem Relation Age of Onset    Hypertension Mother     Sickle Cell Trait Mother     Diabetes Mother     Stroke Mother         SEVERAL TIAS    Hypertension Father     Heart Attack Father     Heart Disease Father     Hypertension Other         sibling    Anesth Problems Neg Hx      Social History     Tobacco Use    Smoking status: Current Every Day Smoker     Packs/day: 0.30     Years: 30.00     Pack years: 9.00     Types: Cigarettes    Smokeless tobacco: Never Used   Substance Use Topics    Alcohol use: No        Review of Systems    A comprehensive review of systems was negative except for that written in the HPI. Objective:     Visit Vitals  /75   Pulse 75   Temp 98.9 °F (37.2 °C) (Oral)   Resp 18   Ht 5' 6\" (1.676 m)   Wt 178 lb 8 oz (81 kg)   SpO2 97%   BMI 28.81 kg/m²     General:  Alert, cooperative, no distress, appears stated age.    Head: Normocephalic, without obvious abnormality, atraumatic. Eyes:  Conjunctivae/corneas clear. PERRL, EOMs intact. Fundi benign. Ears:  Normal TMs and external ear canals both ears. Nose: Nares normal. Septum midline. Mucosa normal. No drainage or sinus tenderness. Throat: Lips, mucosa, and tongue normal. Teeth and gums normal.   Neck: Supple, symmetrical, trachea midline, no adenopathy, thyroid: no enlargement/tenderness/nodules, no carotid bruit and no JVD. Back:   Symmetric, no curvature. ROM normal. No CVA tenderness. Lungs:   Clear to auscultation bilaterally. Chest wall:  No tenderness or deformity. Heart:  Regular rate and rhythm, S1, S2 normal, no murmur, click, rub or gallop. Abdomen:   Soft, non-tender. Bowel sounds normal. No masses,  No organomegaly. Extremities: Extremities normal, atraumatic, no cyanosis or edema. Pulses: 2+ and symmetric all extremities. Skin: Skin color, texture, turgor normal. No rashes or lesions. Lymph nodes: Cervical, supraclavicular, and axillary nodes normal.   Neurologic: CNII-XII intact. Normal strength, sensation and reflexes throughout. Assessment/Plan:       ICD-10-CM ICD-9-CM    1. Encounter for immunization  Z23 V03.89 [place] IMMUNIZ ADMIN,1 SINGLE/COMB VAC/TOXOID      TETANUS, DIPHTHERIA TOXOIDS AND ACELLULAR PERTUSSIS VACCINE (TDAP), IN INDIVIDS. >=7, IM   2. Medicare annual wellness visit, subsequent  I86.76 G88.3 METABOLIC PANEL, COMPREHENSIVE      LIPID PANEL      HEMOGLOBIN A1C W/O EAG      AUDREY MAMMO BI SCREENING INCL CAD      DEXA BONE DENSITY STUDY AXIAL   3. Screening for depression  Z13.31 V79.0 DEPRESSION SCREEN ANNUAL   4. Essential hypertension  I10 401.9    5. Grief  F43.21 309.0 REFERRAL TO BEHAVIORAL HEALTH   6. Colon cancer screening  Z12.11 V76.51 REFERRAL TO GASTROENTEROLOGY   7. Frequent UTI  N39.0 599.0 URINALYSIS W/ RFLX MICROSCOPIC      CULTURE, URINE   8.  Pap smear for cervical cancer screening  Z12.4 V76.2 REFERRAL TO OBSTETRICS AND GYNECOLOGY   9. Postmenopausal  Z78.0 V49.81 DEXA BONE DENSITY STUDY AXIAL   10. Hypokalemia  O00.3 151.6 METABOLIC PANEL, COMPREHENSIVE          Advised her to call back or return to office if symptoms worsen/change/persist.  Discussed expected course/resolution/complications of diagnosis in detail with patient. Medication risks/benefits/costs/interactions/alternatives discussed with patient. She was given an after visit summary which includes diagnoses, current medications, & vitals. She expressed understanding with the diagnosis and plan. This is the Subsequent Medicare Annual Wellness Exam, performed 12 months or more after the Initial AWV or the last Subsequent AWV    I have reviewed the patient's medical history in detail and updated the computerized patient record. Depression Risk Factor Screening:     3 most recent PHQ Screens 11/6/2020   Little interest or pleasure in doing things More than half the days   Feeling down, depressed, irritable, or hopeless More than half the days   Total Score PHQ 2 4       Alcohol Risk Screen   Do you average more than 1 drink per night or more than 7 drinks a week:  no    On any one occasion in the past three months have you have had more than 3 drinks containing alcohol : No        Functional Ability and Level of Safety:   Hearing: Hearing is good. Activities of Daily Living: The home contains: no safety equipment. Patient does total self care     Ambulation: with no difficulty     Fall Risk:  Fall Risk Assessment, last 12 mths 2/5/2018   Able to walk? Yes   Fall in past 12 months? Yes   Fall with injury?  No   Number of falls in past 12 months 1   Fall Risk Score 1     Abuse Screen:  Patient is not abused       Cognitive Screening   Has your family/caregiver stated any concerns about your memory: no         Assessment/Plan   Education and counseling provided:  Are appropriate based on today's review and evaluation    Diagnoses and all orders for this visit:    1. Encounter for immunization  -     [place] IMMUNIZ ADMIN,1 SINGLE/COMB VAC/TOXOID  -     TETANUS, DIPHTHERIA TOXOIDS AND ACELLULAR PERTUSSIS VACCINE (TDAP), IN INDIVIDS. >=7, IM    2. Medicare annual wellness visit, subsequent  -     METABOLIC PANEL, COMPREHENSIVE  -     LIPID PANEL  -     HEMOGLOBIN A1C W/O EAG  -     AUDREY MAMMO BI SCREENING INCL CAD; Future  -     DEXA BONE DENSITY STUDY AXIAL; Future    3. Screening for depression  -     DEPRESSION SCREEN ANNUAL    4. Essential hypertension    5. Grief  -     REFERRAL TO BEHAVIORAL HEALTH    6. Colon cancer screening  -     REFERRAL TO GASTROENTEROLOGY    7. Frequent UTI  -     URINALYSIS W/ RFLX MICROSCOPIC  -     CULTURE, URINE    8. Pap smear for cervical cancer screening  -     REFERRAL TO OBSTETRICS AND GYNECOLOGY    9. Postmenopausal  -     DEXA BONE DENSITY STUDY AXIAL; Future    10. Hypokalemia  -     METABOLIC PANEL, COMPREHENSIVE    Other orders  -     docusate sodium (COLACE) 100 mg capsule;  Take 1 Cap by mouth two (2) times a day for 90 days.  -     MICROSCOPIC EXAMINATION        Health Maintenance Due     Health Maintenance Due   Topic Date Due    PAP AKA CERVICAL CYTOLOGY  08/22/1979    Colorectal Cancer Screening Combo  08/22/2008    Breast Cancer Screen Mammogram  08/22/2008       Patient Care Team   Patient Care Team:  Audelia La MD as PCP - General (Internal Medicine)  Audelia La MD [facility] Empaneled Provider    History     Patient Active Problem List   Diagnosis Code    Chronic back pain M54.9, G89.29    Joint pain M25.50    Joint stiffness M25.60    HTN (hypertension) I10    Arthritis M19.90    Spinal stenosis of lumbar region with neurogenic claudication M48.062    Disc herniation [id number]     Past Medical History:   Diagnosis Date    Chronic pain     low back pain    Constipation     Depression     Fibromyalgia     Hypertension     Osteoarthritis     Other ill-defined conditions(799.89) chronic kidney infections    Second hand smoke exposure     Sickle cell trait ([address]     pt reports    Spinal stenosis     L5-S1 left moderate to severe      Past Surgical History:   Procedure Laterality Date    HX  SECTION      HX ORTHOPAEDIC      R wrist surgery and foot surgery    HX TUBAL LIGATION       Current Outpatient Medications   Medication Sig Dispense Refill    cephALEXin (KEFLEX) 500 mg capsule TK 1 C PO BID      docusate sodium (COLACE) 100 mg capsule Take 1 Cap by mouth two (2) times a day for 90 days. 60 Cap 2    amLODIPine (NORVASC) 10 mg tablet TAKE 1 tablet by mouth once daily 90 Tab 0    gabapentin (NEURONTIN) 600 mg tablet take 1 tablet by mouth three times a day 90 Tab 0    losartan-hydroCHLOROthiazide (HYZAAR) 100-25 mg per tablet take 1 tablet by mouth once daily 90 Tab 1    cyclobenzaprine (FLEXERIL) 10 mg tablet take 1 tablet by mouth three times a day if needed for muscle spasm 30 Tab 1    sertraline (ZOLOFT) 100 mg tablet Take 1 Tab by mouth daily. 30 Tab 5    potassium chloride (KLOR-CON) 10 mEq tablet Take 1 Tab by mouth daily. 5 Tab 0    sertraline (ZOLOFT) 50 mg tablet 1 tab po daily 90 Tab 1    albuterol (PROVENTIL HFA, VENTOLIN HFA, PROAIR HFA) 90 mcg/actuation inhaler Take 1 Puff by inhalation every four (4) hours as needed for Wheezing. 1 Inhaler 1    XTAMPZA ER 18 mg ER capsule TK ONE C PO  BID  0    triamcinolone acetonide (KENALOG) 0.025 % topical cream apply to affected area once daily 30 g 2    oxyCODONE IR (OXY-IR) 15 mg immediate release tablet        Allergies   Allergen Reactions    Other Medication Other (comments)     Pt stated it caused her stomach to hurt when she took the PO dilaudid but has been given it IV with no reaction.        Family History   Problem Relation Age of Onset    Hypertension Mother     Sickle Cell Trait Mother     Diabetes Mother     Stroke Mother         SEVERAL TIAS    Hypertension Father     Heart Attack Father  Heart Disease Father     Hypertension Other         sibling    Anesth Problems Neg Hx      Social History     Tobacco Use    Smoking status: Current Every Day Smoker     Packs/day: 0.30     Years: 30.00     Pack years: 9.00     Types: Cigarettes    Smokeless tobacco: Never Used   Substance Use Topics    Alcohol use:  No

## 2020-11-08 LAB
ALBUMIN SERPL-MCNC: 4.5 G/DL (ref 3.8–4.8)
ALBUMIN/GLOB SERPL: 1.5 {RATIO} (ref 1.2–2.2)
ALP SERPL-CCNC: 127 IU/L (ref 39–117)
ALT SERPL-CCNC: 11 IU/L (ref 0–32)
APPEARANCE UR: CLEAR
AST SERPL-CCNC: 14 IU/L (ref 0–40)
BACTERIA #/AREA URNS HPF: ABNORMAL /[HPF]
BACTERIA UR CULT: NO GROWTH
BILIRUB SERPL-MCNC: 0.5 MG/DL (ref 0–1.2)
BILIRUB UR QL STRIP: NEGATIVE
BUN SERPL-MCNC: 11 MG/DL (ref 8–27)
BUN/CREAT SERPL: 14 (ref 12–28)
CALCIUM SERPL-MCNC: 9.6 MG/DL (ref 8.7–10.3)
CASTS URNS QL MICRO: ABNORMAL /LPF
CHLORIDE SERPL-SCNC: 99 MMOL/L (ref 96–106)
CHOLEST SERPL-MCNC: 185 MG/DL (ref 100–199)
CO2 SERPL-SCNC: 29 MMOL/L (ref 20–29)
COLOR UR: YELLOW
CREAT SERPL-MCNC: 0.8 MG/DL (ref 0.57–1)
EPI CELLS #/AREA URNS HPF: ABNORMAL /HPF (ref 0–10)
GLOBULIN SER CALC-MCNC: 3.1 G/DL (ref 1.5–4.5)
GLUCOSE SERPL-MCNC: 88 MG/DL (ref 65–99)
GLUCOSE UR QL: NEGATIVE
HBA1C MFR BLD: 5.7 % (ref 4.8–5.6)
HDLC SERPL-MCNC: 62 MG/DL
HGB UR QL STRIP: NEGATIVE
KETONES UR QL STRIP: NEGATIVE
LDLC SERPL CALC-MCNC: 105 MG/DL (ref 0–99)
LEUKOCYTE ESTERASE UR QL STRIP: ABNORMAL
MICRO URNS: ABNORMAL
MUCOUS THREADS URNS QL MICRO: PRESENT
NITRITE UR QL STRIP: NEGATIVE
PH UR STRIP: 7 [PH] (ref 5–7.5)
POTASSIUM SERPL-SCNC: 3.7 MMOL/L (ref 3.5–5.2)
PROT SERPL-MCNC: 7.6 G/DL (ref 6–8.5)
PROT UR QL STRIP: NEGATIVE
RBC #/AREA URNS HPF: ABNORMAL /HPF (ref 0–2)
SODIUM SERPL-SCNC: 142 MMOL/L (ref 134–144)
SP GR UR: 1.01 (ref 1–1.03)
TRIGL SERPL-MCNC: 101 MG/DL (ref 0–149)
UROBILINOGEN UR STRIP-MCNC: 1 MG/DL (ref 0.2–1)
VLDLC SERPL CALC-MCNC: 18 MG/DL (ref 5–40)
WBC #/AREA URNS HPF: ABNORMAL /HPF (ref 0–5)

## 2020-11-12 NOTE — PROGRESS NOTES
Please let patient know she has a UTI    I sent macrobid to her pharmacy    Other results are stable

## 2020-11-17 ENCOUNTER — TRANSCRIBE ORDER (OUTPATIENT)
Dept: REGISTRATION | Age: 60
End: 2020-11-17

## 2020-11-17 DIAGNOSIS — Z12.31 VISIT FOR SCREENING MAMMOGRAM: Primary | ICD-10-CM

## 2020-11-30 ENCOUNTER — HOSPITAL ENCOUNTER (OUTPATIENT)
Dept: MAMMOGRAPHY | Age: 60
Discharge: HOME | End: 2020-11-30
Attending: INTERNAL MEDICINE
Payer: MEDICARE

## 2020-11-30 DIAGNOSIS — Z78.0 POSTMENOPAUSAL: ICD-10-CM

## 2020-11-30 DIAGNOSIS — Z00.00 MEDICARE ANNUAL WELLNESS VISIT, SUBSEQUENT: ICD-10-CM

## 2020-11-30 DIAGNOSIS — Z12.31 VISIT FOR SCREENING MAMMOGRAM: ICD-10-CM

## 2020-11-30 PROCEDURE — 77080 DXA BONE DENSITY AXIAL: CPT

## 2020-11-30 PROCEDURE — 77067 SCR MAMMO BI INCL CAD: CPT

## 2020-11-30 NOTE — PROGRESS NOTES
DEXA shows osteopenia or pre osteoporosis    Please ask her to set up a virtual visit to address this

## 2020-12-15 DIAGNOSIS — M54.9 CHRONIC BACK PAIN, UNSPECIFIED BACK LOCATION, UNSPECIFIED BACK PAIN LATERALITY: ICD-10-CM

## 2020-12-15 DIAGNOSIS — G89.29 CHRONIC BACK PAIN, UNSPECIFIED BACK LOCATION, UNSPECIFIED BACK PAIN LATERALITY: ICD-10-CM

## 2020-12-15 RX ORDER — AMLODIPINE BESYLATE 10 MG/1
TABLET ORAL
Qty: 90 TAB | Refills: 0 | Status: SHIPPED | OUTPATIENT
Start: 2020-12-15 | End: 2021-06-03

## 2020-12-15 RX ORDER — GABAPENTIN 600 MG/1
TABLET ORAL
Qty: 90 TAB | Refills: 0 | Status: SHIPPED | OUTPATIENT
Start: 2020-12-15 | End: 2021-04-18

## 2020-12-15 RX ORDER — LOSARTAN POTASSIUM AND HYDROCHLOROTHIAZIDE 25; 100 MG/1; MG/1
TABLET ORAL
Qty: 90 TAB | Refills: 1 | Status: SHIPPED | OUTPATIENT
Start: 2020-12-15

## 2020-12-29 NOTE — TELEPHONE ENCOUNTER
Tried to reach pt by phone to inform her that her potassium was low and Dr. Ray Hicks was sending a prescription for potassium 10 meq.  Randolph Nye LPN Information: Selecting Yes will display possible errors in your note based on the variables you have selected. This validation is only offered as a suggestion for you. PLEASE NOTE THAT THE VALIDATION TEXT WILL BE REMOVED WHEN YOU FINALIZE YOUR NOTE. IF YOU WANT TO FAX A PRELIMINARY NOTE YOU WILL NEED TO TOGGLE THIS TO 'NO' IF YOU DO NOT WANT IT IN YOUR FAXED NOTE.

## 2021-02-26 ENCOUNTER — VIRTUAL VISIT (OUTPATIENT)
Dept: INTERNAL MEDICINE CLINIC | Age: 61
End: 2021-02-26
Payer: MEDICARE

## 2021-02-26 DIAGNOSIS — R10.84 GENERALIZED ABDOMINAL PAIN: ICD-10-CM

## 2021-02-26 DIAGNOSIS — F32.A DEPRESSION, UNSPECIFIED DEPRESSION TYPE: ICD-10-CM

## 2021-02-26 DIAGNOSIS — G89.29 CHRONIC BACK PAIN, UNSPECIFIED BACK LOCATION, UNSPECIFIED BACK PAIN LATERALITY: Primary | ICD-10-CM

## 2021-02-26 DIAGNOSIS — M54.9 CHRONIC BACK PAIN, UNSPECIFIED BACK LOCATION, UNSPECIFIED BACK PAIN LATERALITY: Primary | ICD-10-CM

## 2021-02-26 PROCEDURE — 99214 OFFICE O/P EST MOD 30 MIN: CPT | Performed by: INTERNAL MEDICINE

## 2021-02-26 RX ORDER — SERTRALINE HYDROCHLORIDE 100 MG/1
100 TABLET, FILM COATED ORAL DAILY
Qty: 30 TAB | Refills: 3 | Status: SHIPPED | OUTPATIENT
Start: 2021-02-26

## 2021-02-26 RX ORDER — CYCLOBENZAPRINE HCL 10 MG
10 TABLET ORAL
Qty: 30 TAB | Refills: 1 | Status: SHIPPED | OUTPATIENT
Start: 2021-02-26

## 2021-02-26 RX ORDER — PHENOL/SODIUM PHENOLATE
20 AEROSOL, SPRAY (ML) MUCOUS MEMBRANE DAILY
Qty: 30 TAB | Refills: 3 | Status: SHIPPED | OUTPATIENT
Start: 2021-02-26

## 2021-02-26 NOTE — PROGRESS NOTES
Glendy Hernandez is a 56 y.o. female who was seen by synchronous (real-time) audio-video technology on 2/26/2021 for Abdominal Pain (had pain yesterday, but today its not hurting her. Said she took toast before her meds today and that helped.) and Medication Refill (zoloft)          Assessment & Plan:   Diagnoses and all orders for this visit:    1. Chronic back pain, unspecified back location, unspecified back pain laterality  Flexeril refilled today  2. Generalized abdominal pain- GERD vs GASTRITIS vs CONSTIPATION  -     REFERRAL TO GASTROENTEROLOGY  Start omeprazole  Take senokot as needed  Increase fluids  3. Depression, unspecified depression type  Continue sertraline  Other orders  -     Omeprazole delayed release (PRILOSEC D/R) 20 mg tablet; Take 1 Tab by mouth daily. -     sertraline (ZOLOFT) 100 mg tablet; Take 1 Tab by mouth daily. -     cyclobenzaprine (FLEXERIL) 10 mg tablet; Take 1 Tab by mouth three (3) times daily as needed for Muscle Spasm(s). Subjective:   Patient said yesterday her stomach was upset. She had pain around her navel. She said it hurt all day. No urinary changes. She is constipated and has been taking sennokot. Her last bm was this morning and was normal, yesterday she had a small amt of blood when she wiped. She says the senokot helps her to pass stools more easily. No nausea or vomiting, appetitie is not good. Denies fever    She has noted a lot of burping and belching. She says she has acid reflux which comes and goes  She thinks she has not been eating enough when taking her medications    She has depression and is taking zoloft 100 mg po daily. She says this is working well    Prior to Admission medications    Medication Sig Start Date End Date Taking?  Authorizing Provider   losartan-hydroCHLOROthiazide [facility]) 100-25 mg per tablet take 1 tablet by mouth once daily 12/15/20  Yes Miranda Sanchez MD   amLODIPine (NORVASC) 10 mg tablet Take 1 tablet daily 12/15/20  Yes Axel Gray MD   gabapentin (NEURONTIN) 600 mg tablet Take 1 tablet three times a day 12/15/20  Yes Axel Gray MD   cyclobenzaprine (FLEXERIL) 10 mg tablet take 1 tablet by mouth three times a day if needed for muscle spasm 5/21/20  Yes Axel Gray MD   sertraline (ZOLOFT) 100 mg tablet Take 1 Tab by mouth daily. 4/3/20  Yes Axel Gray MD   potassium chloride (KLOR-CON) 10 mEq tablet Take 1 Tab by mouth daily. 4/3/20  Yes Axel Gray MD   albuterol (PROVENTIL HFA, VENTOLIN HFA, PROAIR HFA) 90 mcg/actuation inhaler Take 1 Puff by inhalation every four (4) hours as needed for Wheezing. 1/29/20  Yes Ilene Salomon MD   XTAMPZA ER 18 mg ER capsule TK ONE C PO  BID 4/4/19  Yes Provider, Historical   triamcinolone acetonide (KENALOG) 0.025 % topical cream apply to affected area once daily 6/14/18  Yes Axel Gray MD   oxyCODONE IR (OXY-IR) 15 mg immediate release tablet  3/16/16  Yes Provider, Historical   nitrofurantoin, macrocrystal-monohydrate, (Macrobid) 100 mg capsule Take 1 Cap by mouth two (2) times a day. 11/12/20   Ilene Salomon MD   cephALEXin (KEFLEX) 500 mg capsule TK 1 C PO BID 11/1/20   Provider, Historical   sertraline (ZOLOFT) 50 mg tablet 1 tab po daily 1/29/20   Axel Gray MD         Review of Systems   Constitutional: Negative for weight loss. Eyes: Negative for blurred vision. Respiratory: Negative for shortness of breath. Cardiovascular: Negative for chest pain and leg swelling. Gastrointestinal: Positive for abdominal pain and constipation. Genitourinary: Negative for frequency and urgency. Musculoskeletal: Negative for joint pain. Neurological: Negative for headaches. Objective:   No flowsheet data found.      [INSTRUCTIONS:  \"[x]\" Indicates a positive item  \"[]\" Indicates a negative item  -- DELETE ALL ITEMS NOT EXAMINED]    Constitutional: [x] Appears well-developed and well-nourished [x] No apparent distress      [] Abnormal -     Mental status: [x] Alert and awake  [x] Oriented to person/place/time [x] Able to follow commands    [] Abnormal -     Eyes:   EOM    [x]  Normal    [] Abnormal -   Sclera  [x]  Normal    [] Abnormal -          Discharge [x]  None visible   [] Abnormal -     HENT: [x] Normocephalic, atraumatic  [] Abnormal -   [x] Mouth/Throat: Mucous membranes are moist    External Ears [x] Normal  [] Abnormal -    Neck: [x] No visualized mass [] Abnormal -     Pulmonary/Chest: [x] Respiratory effort normal   [x] No visualized signs of difficulty breathing or respiratory distress        [] Abnormal -      Musculoskeletal:   [x] Normal gait with no signs of ataxia         [x] Normal range of motion of neck        [] Abnormal -     Neurological:        [x] No Facial Asymmetry (Cranial nerve 7 motor function) (limited exam due to video visit)          [x] No gaze palsy        [] Abnormal -          Skin:        [x] No significant exanthematous lesions or discoloration noted on facial skin         [] Abnormal -            Psychiatric:       [x] Normal Affect [] Abnormal -        [x] No Hallucinations    Other pertinent observable physical exam findings:-        We discussed the expected course, resolution and complications of the diagnosis(es) in detail. Medication risks, benefits, costs, interactions, and alternatives were discussed as indicated. I advised her to contact the office if her condition worsens, changes or fails to improve as anticipated. She expressed understanding with the diagnosis(es) and plan. Fran Bryant, who was evaluated through a patient-initiated, synchronous (real-time) audio-video encounter, and/or her healthcare decision maker, is aware that it is a billable service, with coverage as determined by her insurance carrier. She provided verbal consent to proceed: Yes, and patient identification was verified.  It was conducted pursuant to the emergency declaration under the [facility], [address] [place] [address] authority and the [employer] General Act. A caregiver was present when appropriate. Ability to conduct physical exam was limited. I was at home. The patient was at home.       Dane Weaver MD

## 2021-02-26 NOTE — PROGRESS NOTES
Chief Complaint   Patient presents with    Abdominal Pain     had pain yesterday, but today its not hurting her. Said she took toast before her meds today and that helped.  Medication Refill     zoloft         1. Have you been to the ER, urgent care clinic since your last visit? Hospitalized since your last visit? no    2. Have you seen or consulted any other health care providers outside of the [address] [place] [address] since your last visit? Include any pap smears or colon screening.   no

## 2021-04-18 DIAGNOSIS — G89.29 CHRONIC BACK PAIN, UNSPECIFIED BACK LOCATION, UNSPECIFIED BACK PAIN LATERALITY: ICD-10-CM

## 2021-04-18 DIAGNOSIS — M54.9 CHRONIC BACK PAIN, UNSPECIFIED BACK LOCATION, UNSPECIFIED BACK PAIN LATERALITY: ICD-10-CM

## 2021-04-18 RX ORDER — GABAPENTIN 600 MG/1
TABLET ORAL
Qty: 90 TAB | Refills: 0 | Status: SHIPPED | OUTPATIENT
Start: 2021-04-18

## 2021-06-03 RX ORDER — AMLODIPINE BESYLATE 10 MG/1
TABLET ORAL
Qty: 90 TABLET | Refills: 0 | Status: SHIPPED | OUTPATIENT
Start: 2021-06-03

## 2021-10-14 ENCOUNTER — TRANSCRIBE ORDER (OUTPATIENT)
Dept: SCHEDULING | Age: 61
End: 2021-10-14

## 2021-10-14 DIAGNOSIS — Z98.890 PERSONAL HISTORY OF SURGERY TO HEART AND GREAT VESSELS, PRESENTING HAZARDS TO HEALTH: Primary | ICD-10-CM

## 2021-11-10 ENCOUNTER — TRANSCRIBE ORDER (OUTPATIENT)
Dept: SCHEDULING | Age: 61
End: 2021-11-10

## 2021-11-10 DIAGNOSIS — Z98.890 H/O SPINAL SURGERY: Primary | ICD-10-CM

## 2021-11-13 ENCOUNTER — HOSPITAL ENCOUNTER (OUTPATIENT)
Dept: MRI IMAGING | Age: 61
Discharge: HOME | End: 2021-11-13
Attending: FAMILY MEDICINE
Payer: MEDICARE

## 2021-11-13 DIAGNOSIS — Z98.890 H/O SPINAL SURGERY: ICD-10-CM

## 2021-11-13 PROCEDURE — 74011250636 HC RX REV CODE- 250/636

## 2021-11-13 PROCEDURE — 72158 MRI LUMBAR SPINE W/O & W/DYE: CPT

## 2021-11-13 PROCEDURE — A9576 INJ PROHANCE MULTIPACK: HCPCS

## 2021-11-13 RX ADMIN — GADOTERIDOL 16 ML: 279.3 INJECTION, SOLUTION INTRAVENOUS at 12:45

## 2021-12-14 ENCOUNTER — TRANSCRIBE ORDER (OUTPATIENT)
Dept: SCHEDULING | Age: 61
End: 2021-12-14

## 2021-12-14 DIAGNOSIS — Z12.31 VISIT FOR SCREENING MAMMOGRAM: Primary | ICD-10-CM

## 2022-01-18 ENCOUNTER — HOSPITAL ENCOUNTER (OUTPATIENT)
Dept: MAMMOGRAPHY | Age: 62
Discharge: HOME | End: 2022-01-18
Attending: FAMILY MEDICINE
Payer: MEDICARE

## 2022-01-18 DIAGNOSIS — Z12.31 VISIT FOR SCREENING MAMMOGRAM: ICD-10-CM

## 2022-01-18 PROCEDURE — 77067 SCR MAMMO BI INCL CAD: CPT

## 2022-11-18 ENCOUNTER — TRANSCRIBE ORDER (OUTPATIENT)
Dept: SCHEDULING | Age: 62
End: 2022-11-18

## 2022-11-18 ENCOUNTER — HOSPITAL ENCOUNTER (OUTPATIENT)
Dept: CT IMAGING | Age: 62
Discharge: HOME | End: 2022-11-18
Attending: FAMILY MEDICINE
Payer: MEDICARE

## 2022-11-18 DIAGNOSIS — R10.9 ABDOMINAL PAIN: Primary | ICD-10-CM

## 2022-11-18 DIAGNOSIS — R10.9 ABDOMINAL PAIN: ICD-10-CM

## 2022-11-18 PROCEDURE — 74177 CT ABD & PELVIS W/CONTRAST: CPT

## 2022-11-18 PROCEDURE — 74011000636 HC RX REV CODE- 636: Performed by: STUDENT IN AN ORGANIZED HEALTH CARE EDUCATION/TRAINING PROGRAM

## 2022-11-18 RX ADMIN — IOPAMIDOL 100 ML: 755 INJECTION, SOLUTION INTRAVENOUS at 16:22

## 2023-01-24 ENCOUNTER — TRANSCRIBE ORDER (OUTPATIENT)
Dept: SCHEDULING | Age: 63
End: 2023-01-24

## 2023-01-24 DIAGNOSIS — Z12.31 BREAST CANCER SCREENING BY MAMMOGRAM: Primary | ICD-10-CM

## 2023-04-22 DIAGNOSIS — Z12.31 BREAST CANCER SCREENING BY MAMMOGRAM: Primary | ICD-10-CM

## 2024-02-02 ENCOUNTER — TRANSCRIBE ORDERS (OUTPATIENT)
Facility: HOSPITAL | Age: 64
End: 2024-02-02

## 2024-02-02 DIAGNOSIS — Z12.31 VISIT FOR SCREENING MAMMOGRAM: Primary | ICD-10-CM

## 2024-02-02 DIAGNOSIS — M81.0 POSTMENOPAUSAL OSTEOPOROSIS: ICD-10-CM

## 2024-09-30 ENCOUNTER — HOSPITAL ENCOUNTER (OUTPATIENT)
Facility: HOSPITAL | Age: 64
Discharge: HOME | End: 2024-10-03
Payer: MEDICARE

## 2024-09-30 VITALS — HEIGHT: 66 IN | WEIGHT: 146 LBS | BODY MASS INDEX: 23.46 KG/M2

## 2024-09-30 DIAGNOSIS — Z12.31 VISIT FOR SCREENING MAMMOGRAM: ICD-10-CM

## 2024-09-30 PROCEDURE — 77063 BREAST TOMOSYNTHESIS BI: CPT

## 2025-07-21 ENCOUNTER — TRANSCRIBE ORDERS (OUTPATIENT)
Facility: HOSPITAL | Age: 65
End: 2025-07-21

## 2025-07-21 DIAGNOSIS — M79.604 BILATERAL LEG PAIN: Primary | ICD-10-CM

## 2025-07-21 DIAGNOSIS — M79.605 BILATERAL LEG PAIN: Primary | ICD-10-CM

## 2025-07-22 ENCOUNTER — HOSPITAL ENCOUNTER (OUTPATIENT)
Facility: HOSPITAL | Age: 65
Discharge: HOME | End: 2025-07-24
Payer: MEDICARE

## 2025-07-22 DIAGNOSIS — M79.604 BILATERAL LEG PAIN: ICD-10-CM

## 2025-07-22 DIAGNOSIS — M79.605 BILATERAL LEG PAIN: ICD-10-CM

## 2025-07-22 PROCEDURE — 93925 LOWER EXTREMITY STUDY: CPT

## 2025-07-24 LAB
VAS LEFT ATA PROX PSV: 63.2 CM/S
VAS LEFT CFA PROX PSV: 91.4 CM/S
VAS LEFT PFA PROX PSV: 57.4 CM/S
VAS LEFT POP A DIST PSV: 72.7 CM/S
VAS LEFT POP A PROX PSV: 50.2 CM/S
VAS LEFT POP A PROX VEL RATIO: 0.56
VAS LEFT PTA PROX PSV: 51.8 CM/S
VAS LEFT SFA DIST PSV: 88.9 CM/S
VAS LEFT SFA DIST VEL RATIO: 0.85
VAS LEFT SFA MID PSV: 104.7 CM/S
VAS LEFT SFA MID VEL RATIO: 1.41
VAS LEFT SFA PROX PSV: 74.4 CM/S
VAS LEFT SFA PROX VEL RATIO: 0.81
VAS RIGHT ATA PROX PSV: 71.9 CM/S
VAS RIGHT CFA PROX PSV: 85.4 CM/S
VAS RIGHT PFA PROX PSV: 66.9 CM/S
VAS RIGHT POP A DIST PSV: 76.8 CM/S
VAS RIGHT POP A PROX PSV: 51.5 CM/S
VAS RIGHT POP A PROX VEL RATIO: 0.63
VAS RIGHT PTA PROX PSV: 61.1 CM/S
VAS RIGHT SFA DIST PSV: 82.1 CM/S
VAS RIGHT SFA DIST VEL RATIO: 0.73
VAS RIGHT SFA MID PSV: 112.6 CM/S